# Patient Record
Sex: MALE | Race: BLACK OR AFRICAN AMERICAN | NOT HISPANIC OR LATINO | URBAN - METROPOLITAN AREA
[De-identification: names, ages, dates, MRNs, and addresses within clinical notes are randomized per-mention and may not be internally consistent; named-entity substitution may affect disease eponyms.]

---

## 2021-10-22 ENCOUNTER — INPATIENT (INPATIENT)
Facility: HOSPITAL | Age: 56
LOS: 5 days | Discharge: HOME | End: 2021-10-28
Attending: INTERNAL MEDICINE | Admitting: INTERNAL MEDICINE
Payer: MEDICAID

## 2021-10-22 VITALS
RESPIRATION RATE: 18 BRPM | HEART RATE: 126 BPM | SYSTOLIC BLOOD PRESSURE: 264 MMHG | OXYGEN SATURATION: 100 % | TEMPERATURE: 97 F | DIASTOLIC BLOOD PRESSURE: 135 MMHG

## 2021-10-22 LAB
ALBUMIN SERPL ELPH-MCNC: 4.4 G/DL — SIGNIFICANT CHANGE UP (ref 3.5–5.2)
ALP SERPL-CCNC: 66 U/L — SIGNIFICANT CHANGE UP (ref 30–115)
ALT FLD-CCNC: 16 U/L — SIGNIFICANT CHANGE UP (ref 0–41)
ANION GAP SERPL CALC-SCNC: 15 MMOL/L — HIGH (ref 7–14)
APTT BLD: 32.1 SEC — SIGNIFICANT CHANGE UP (ref 27–39.2)
AST SERPL-CCNC: 45 U/L — HIGH (ref 0–41)
BASOPHILS # BLD AUTO: 0.02 K/UL — SIGNIFICANT CHANGE UP (ref 0–0.2)
BASOPHILS NFR BLD AUTO: 0.3 % — SIGNIFICANT CHANGE UP (ref 0–1)
BILIRUB SERPL-MCNC: 1.4 MG/DL — HIGH (ref 0.2–1.2)
BUN SERPL-MCNC: 16 MG/DL — SIGNIFICANT CHANGE UP (ref 10–20)
CALCIUM SERPL-MCNC: 9 MG/DL — SIGNIFICANT CHANGE UP (ref 8.5–10.1)
CHLORIDE SERPL-SCNC: 100 MMOL/L — SIGNIFICANT CHANGE UP (ref 98–110)
CO2 SERPL-SCNC: 22 MMOL/L — SIGNIFICANT CHANGE UP (ref 17–32)
CREAT SERPL-MCNC: 1.3 MG/DL — SIGNIFICANT CHANGE UP (ref 0.7–1.5)
EOSINOPHIL # BLD AUTO: 0 K/UL — SIGNIFICANT CHANGE UP (ref 0–0.7)
EOSINOPHIL NFR BLD AUTO: 0 % — SIGNIFICANT CHANGE UP (ref 0–8)
GLUCOSE SERPL-MCNC: 109 MG/DL — HIGH (ref 70–99)
HCT VFR BLD CALC: 42.4 % — SIGNIFICANT CHANGE UP (ref 42–52)
HGB BLD-MCNC: 14.1 G/DL — SIGNIFICANT CHANGE UP (ref 14–18)
IMM GRANULOCYTES NFR BLD AUTO: 0.3 % — SIGNIFICANT CHANGE UP (ref 0.1–0.3)
INR BLD: 1.15 RATIO — SIGNIFICANT CHANGE UP (ref 0.65–1.3)
LYMPHOCYTES # BLD AUTO: 0.93 K/UL — LOW (ref 1.2–3.4)
LYMPHOCYTES # BLD AUTO: 16.1 % — LOW (ref 20.5–51.1)
MCHC RBC-ENTMCNC: 29.3 PG — SIGNIFICANT CHANGE UP (ref 27–31)
MCHC RBC-ENTMCNC: 33.3 G/DL — SIGNIFICANT CHANGE UP (ref 32–37)
MCV RBC AUTO: 88.1 FL — SIGNIFICANT CHANGE UP (ref 80–94)
MONOCYTES # BLD AUTO: 0.32 K/UL — SIGNIFICANT CHANGE UP (ref 0.1–0.6)
MONOCYTES NFR BLD AUTO: 5.5 % — SIGNIFICANT CHANGE UP (ref 1.7–9.3)
NEUTROPHILS # BLD AUTO: 4.5 K/UL — SIGNIFICANT CHANGE UP (ref 1.4–6.5)
NEUTROPHILS NFR BLD AUTO: 77.8 % — HIGH (ref 42.2–75.2)
NRBC # BLD: 0 /100 WBCS — SIGNIFICANT CHANGE UP (ref 0–0)
PLATELET # BLD AUTO: 161 K/UL — SIGNIFICANT CHANGE UP (ref 130–400)
POTASSIUM SERPL-MCNC: 3.5 MMOL/L — SIGNIFICANT CHANGE UP (ref 3.5–5)
POTASSIUM SERPL-SCNC: 3.5 MMOL/L — SIGNIFICANT CHANGE UP (ref 3.5–5)
PROT SERPL-MCNC: 6.8 G/DL — SIGNIFICANT CHANGE UP (ref 6–8)
PROTHROM AB SERPL-ACNC: 13.2 SEC — HIGH (ref 9.95–12.87)
RBC # BLD: 4.81 M/UL — SIGNIFICANT CHANGE UP (ref 4.7–6.1)
RBC # FLD: 13.7 % — SIGNIFICANT CHANGE UP (ref 11.5–14.5)
SARS-COV-2 RNA SPEC QL NAA+PROBE: SIGNIFICANT CHANGE UP
SODIUM SERPL-SCNC: 137 MMOL/L — SIGNIFICANT CHANGE UP (ref 135–146)
TROPONIN T SERPL-MCNC: <0.01 NG/ML — SIGNIFICANT CHANGE UP
WBC # BLD: 5.79 K/UL — SIGNIFICANT CHANGE UP (ref 4.8–10.8)
WBC # FLD AUTO: 5.79 K/UL — SIGNIFICANT CHANGE UP (ref 4.8–10.8)

## 2021-10-22 PROCEDURE — 0042T: CPT

## 2021-10-22 PROCEDURE — 70496 CT ANGIOGRAPHY HEAD: CPT | Mod: 26,MA

## 2021-10-22 PROCEDURE — 36620 INSERTION CATHETER ARTERY: CPT

## 2021-10-22 PROCEDURE — 93010 ELECTROCARDIOGRAM REPORT: CPT

## 2021-10-22 PROCEDURE — 99291 CRITICAL CARE FIRST HOUR: CPT | Mod: 25

## 2021-10-22 PROCEDURE — 70498 CT ANGIOGRAPHY NECK: CPT | Mod: 26,MA

## 2021-10-22 PROCEDURE — 99222 1ST HOSP IP/OBS MODERATE 55: CPT

## 2021-10-22 RX ORDER — CHLORHEXIDINE GLUCONATE 213 G/1000ML
1 SOLUTION TOPICAL
Refills: 0 | Status: DISCONTINUED | OUTPATIENT
Start: 2021-10-22 | End: 2021-10-28

## 2021-10-22 RX ORDER — CLEVIDIPINE BUTYRATE 50MG/100ML
1 VIAL (ML) INTRAVENOUS
Qty: 25 | Refills: 0 | Status: DISCONTINUED | OUTPATIENT
Start: 2021-10-22 | End: 2021-10-22

## 2021-10-22 RX ORDER — CLEVIDIPINE BUTYRATE 50MG/100ML
1 VIAL (ML) INTRAVENOUS
Qty: 25 | Refills: 0 | Status: DISCONTINUED | OUTPATIENT
Start: 2021-10-22 | End: 2021-10-24

## 2021-10-22 RX ORDER — ACETAMINOPHEN 500 MG
650 TABLET ORAL EVERY 6 HOURS
Refills: 0 | Status: DISCONTINUED | OUTPATIENT
Start: 2021-10-22 | End: 2021-10-28

## 2021-10-22 RX ORDER — SODIUM CHLORIDE 9 MG/ML
1000 INJECTION INTRAMUSCULAR; INTRAVENOUS; SUBCUTANEOUS
Refills: 0 | Status: DISCONTINUED | OUTPATIENT
Start: 2021-10-22 | End: 2021-10-23

## 2021-10-22 RX ADMIN — SODIUM CHLORIDE 50 MILLILITER(S): 9 INJECTION INTRAMUSCULAR; INTRAVENOUS; SUBCUTANEOUS at 19:10

## 2021-10-22 RX ADMIN — Medication 2 MG/HR: at 15:01

## 2021-10-22 NOTE — ED PROVIDER NOTE - PHYSICAL EXAMINATION
CONSTITUTIONAL: Well-developed; well-nourished; in no acute distress.   SKIN: warm, dry  HEAD: Normocephalic; atraumatic.  EYES: R eye dilated, L eye constricted, ptosis R eye  ENT: No nasal discharge; airway clear.  NECK: Supple; non tender.  CARD: S1, S2 normal; no murmurs, gallops, or rubs. Regular rate and rhythm.   RESP: No wheezes, rales or rhonchi.  ABD: soft ntnd  EXT: Normal ROM.  No clubbing, cyanosis or edema.   LYMPH: No acute cervical adenopathy.  NEURO: Alert, oriented. ptosis R eye,  PSYCH: Cooperative, appropriate.

## 2021-10-22 NOTE — ED ADULT NURSE NOTE - CHIEF COMPLAINT QUOTE
Pt c/o dizziness and double vision to R eye that started 10/21/21 at 2215. FS 94. Pt hypertensive 264/135 in triage. Denies any other symptoms. Code stroke activated in triage.

## 2021-10-22 NOTE — ED PROVIDER NOTE - CARE PLAN
1 Principal Discharge DX:	PRES (posterior reversible encephalopathy syndrome)  Secondary Diagnosis:	Hypertensive emergency

## 2021-10-22 NOTE — ED PROVIDER NOTE - CLINICAL SUMMARY MEDICAL DECISION MAKING FREE TEXT BOX
57 Y/O M HTN WITH DIPLOPIA AND HA. EXAM WITH B/L CN3 PALSY. PT WITH MARKEDLY ELEVATED BP IN THE ED. CLEVIPREX STARTED. CT SCANS REVIEWED. NO ACUTE INFARCT. POSSIBLE PRES. PT ADMITTED TO NEURO ICU.

## 2021-10-22 NOTE — ED PROVIDER NOTE - ATTENDING CONTRIBUTION TO CARE
I personally evaluated the patient. I reviewed the Resident’s or Physician Assistant’s note (as assigned above), and agree with the findings and plan except as documented in my note.   57 Y/O M HTN, NONSMOKER C/O DIPLOPIA, DIZZINESS AND HEADACHE SINCE 10:15 PM LAST NIGHT. STROKE CODE ACTIVATED IN TRIAGE. NO N/V. NO PARESTHESIAS, MOTOR WEAKNESS, ATAXIA, SPEECH CHANGES. HEADACHE IS DESCRIBED AS PRESSURE AND IS DIFFUSE. NO NECK PAIN. NO RECENT ILLNESS, FEVER, CHILLS, COUGH, URI SXS. PT REPORTS COMPLIANCE WITH HTN MEDICATIONS. VITALS NOTED. . ALERT OX3 NAD. NCAT PERRL EOMI. CN 2-12 INTACT EXCEPT FOR B/L CN3 PALSY AND R EYE PTOSIS. NORMAL SPEECH. NO FACIAL DROOP. 5/5 MOTOR STRENGTH B/L UPPER AND B/L LOWER EXT. NO PRONATOR DRIFT. NORMAL FINGER TO NOSE B/L. NO SENSORY DEFICIT. RRR. LUNGS CLEAR B/L. ABD- SOFT NONTENDER. BACK NONTENDER. 2+ RADIAL AND PEDAL PULSES B/L.

## 2021-10-22 NOTE — CONSULT NOTE ADULT - SUBJECTIVE AND OBJECTIVE BOX
Stroke Consult Note:    TERESE BECKER     Chief Complaint: double vision    HPI: 56Y male with PMHx of HTN presents to the ED for diplopia. Patient states he has had double vision since 10:15pm yesterday. Patient also states associated sx of pressure headache. Patient has no hx of strokes.      Relevant Brain Tissue Imaging:  < from: CT Brain Stroke Protocol (10.22.21 @ 14:43) >  IMPRESSION:    No evidence for intracranial hemorrhage, large territorial infarct, midline shift, or mass effect.    These findings were discussed with AMERICA Diana at 10/22/2021 3:01 PM by Dr. Zuñiga with read back confirmation.      ATTENDING ADDENDUM:    Multiple scattered patchy low attenuations in the bilateral periventricular cerebral white matter without mass effect, nonspecific, could reflect chronic microvascular ischemic changes, demyelinating disease, or leukoencephalopathy. Recommend further evaluation with contrast-enhanced MRI.    --- End of Report ---      Relevant Cerebrovascular Imaging:   CT Angio Neck w/ IV Cont:   EXAM:  CT ANGIO BRAIN (W)AW IC        EXAM:  CT ANGIO NECK (W)AW IC        EXAM:  CT PERFUSION W MAPS IC        PROCEDURE DATE:  10/22/2021      INTERPRETATION:  CLINICAL INDICATION: Stroke code. Right-sided double vision, dizziness.    TECHNIQUE: CT angiography of the intracranial (head) and extracranial (neck) circulation was performed after contrast administration    Maximal intensity projection images were additionally included and reviewed.    RAPID PERFUSION images were obtained. Color maps were reviewed.    50 mls of Omnipaque 350 was administered intravenously and 50 mls were discarded. An additional 70 MLS of Omnipaque 350 was administered intravenously for CTA head/neck imaging.    Using a separate workstation, 3-D shaded surface reformations were made of vasculature. 3-D reformations were reviewed and included in interpretation of the official report.    CAROTID STENOSIS REFERENCE: (NASCET = 100x1-(N/D)). N=greatest narrowing. D=normal distal diameter - MILD = <50% stenosis. - MODERATE = 50-69% stenosis. - SEVERE = 70-89% stenosis. - HAIRLINE/CRITICAL = 90-99% stenosis. - OCCLUDED = 100% stenosis.    COMPARISON: None.    FINDINGS:    CT PERFUSION:    Using standard threshold of Tmax greater than 6 seconds, thereis an area of hypoperfusion in the right temporal lobe with a total volume of 4 mL. There is corresponding decreased CBV, CBF, and increased Tmax in this region.    CORE INFARCT: Using the threshold of CBF less than 30%, there is no area of ischemic core.      HEAD CTA:    The internal carotid arteries demonstrate normal enhancement to the intracranial circulation and [place]. Mild calcific atherosclerotic disease in the bilateral carotid siphons with mild focal stenosis in the distal cavernous segment of the right ICA.    Anterior and middle cerebral arteries demonstrate normal enhancement without intraluminal filling defect, stenosis, occlusion, aneurysm or vascular malformation.    The intradural vertebral arteries demonstrate normal enhancement to the vertebrobasilar confluence. Branch vasculature of the posterior circulation are within normal limits. The posterior cerebral arteries demonstrate symmetric enhancement and arborization without evidence for aneurysm, stenosis, occlusion or vascular malformation.    Visualized dural venous sinuses and deep cerebral venous structures demonstrate normal enhancement without evidence for filling defect.    NECK CTA:    The aortic arch and origins of the great vessels are within normal limits. Evaluation of the right subclavian artery is limited due to streak artifact from contrast within the right subclavian venous plexus.    Right:  The origin of the right common carotid artery is normal. The right common carotid artery is normal in course and caliber to the carotid bulb. Mild noncalcific/calcified atherosclerotic plaque at the right carotid bulb resulting in mild stenosis in the origin of the right internal carotid artery (less than 50%). The remaining right internal carotid artery has normal course and caliber to the intracranial circulation.    Left: The origin of the left common carotid artery is normal. The left common carotid artery is normal in course and caliber to the carotid bifurcation. Mild calcific atherosclerotic plaque at the left carotid bulb without flow-limiting stenosis. The left internal carotid artery has normal course and caliber to the intracranial circulation.    The vertebral arteries are codominant. The origin of the right vertebral artery is normal. The right vertebral artery is normal in course and caliber to the intracranial circulation.  The left vertebral artery originates from the aortic arch. There is mild irregularity in the V3 segment of the left vertebral artery, nonspecific.    Other:  Visualized airways are unremarkable.  There is mild degenerative change of the cervical spine.  Please see separately dictated report for the concurrently performed CT of the head for intracranial findings.    IMPRESSION:    CT PERFUSION:  Calculated hypoperfusion in the right temporal lobe (4 mL) without perfusion evidence of core infarct. Decreased CBV, CBF, and increased Tmax in this region is felt to be artifactual. No evidence of corresponding CTA abnormality.      CTA HEAD/NECK:  No evidence of large vessel occlusion, aneurysm, vascular malformation.    Mild atherosclerotic stenosis in the origin (less than 50%) and in the distal cavernous segment of the right ICA.    --- End of Report ---    Relevant blood tests:                          14.1   5.79  )-----------( 161      ( 22 Oct 2021 14:36 )             42.4   PT/INR - ( 22 Oct 2021 14:36 )   PT: 13.20 sec;   INR: 1.15 ratio         PTT - ( 22 Oct 2021 14:36 )  PTT:32.1 sec  10-22    137  |  100  |  16  ----------------------------<  109<H>  3.5   |  22  |  1.3    Ca    9.0      22 Oct 2021 14:36    TPro  6.8  /  Alb  4.4  /  TBili  1.4<H>  /  DBili  x   /  AST  45<H>  /  ALT  16  /  AlkPhos  66  10-22      Home Medications:      MEDICATIONS  (STANDING):  clevidipine Infusion 1 mG/Hr (2 mL/Hr) IV Continuous <Continuous>      11. Exam:    Vital Signs Last 24 Hrs  T(C): 36.1 (22 Oct 2021 14:29), Max: 36.1 (22 Oct 2021 14:29)  T(F): 97 (22 Oct 2021 14:29), Max: 97 (22 Oct 2021 14:29)  HR: 98 (22 Oct 2021 17:54) (98 - 126)  BP: 188/106 (22 Oct 2021 17:54) (186/96 - 264/135)  BP(mean): --  RR: 18 (22 Oct 2021 17:54) (18 - 18)  SpO2: 100% (22 Oct 2021 17:54) (100% - 100%)    12.     NIH STROKE SCALE  Item	                                                        Score  1 a.	Level of Consciousness	               	0  1 b. LOC Questions	                                0  1 c.	LOC Commands	                               	0  2.	Best Gaze	                                        1  3.	Visual	                                                0  4.	Facial Palsy	                                        0  5 a.	Motor Arm - Left	                                0  5 b.	Motor Arm - Right	                        0  6 a.	Motor Leg - Left	                                0  6 b.	Motor Leg - Right	                                0  7.	Limb Ataxia	                                        0  8.	Sensory	                                                0  9.	Language	                                        0  10.	Dysarthria	                                        0  11.	Extinction and Inattention  	        0  ______________________________________  TOTAL	                                                        0    Total NIHSS on admission:      NIHSS yesterday:      NIHSS today: 1  +ptosis on the right eye  +dilated pupil 7-8mm on right eye, 2-3mm constriction on the left eye  +b/l cranial nerve 3 palsies   mRS:  0 No symptoms at all  1 No significant disability despite symptoms; able to carry out all usual duties and activities without assistance  2 Slight disability; unable to carry out all previous activities, but able to look after own affairs  3 Moderate disability; requiring some help, but able to walk without assistance  4 Moderately severe disability; unable to walk without assistance and unable to attend to own bodily needs without assistance  5 Severe disability; bedridden, incontinent and requiring constant nursing care and attention  6 Dead      Assessment: 56Y male with PMHx of HTN presents to the ED for diplopia. Patient states he has had double vision since 10:15pm yesterday. Patient also states associated sx of pressure headache. Patient has no hx of strokes. CTH- negative for acute findings, also multiple scattered patchy low attenuations in the bilateral periventricular cerebral white matter without mass effect, nonspecific, could reflect chronic microvascular ischemic changes, demyelinating disease, or leukoencephalopathy. CTA- no LVO, Mild atherosclerotic stenosis in the origin (less than 50%) and in the distal cavernous segment of the right ICA. BP on arrival systolic was in the 260s.     DDx: Hypertensive emergency, PRESS, b/l cranial neuropathies     Suggestions:  Admit patient to [facility] for q1 neurochecks and vital signs  Keep -220  Telemonitoring   Follow stroke orderset  MRI with brain non contrast  Case discussed with Dr. Newman. Pending note to follow    Stroke Consult Note:    TERESE BECKER     Chief Complaint: double vision    HPI: 56Y male with PMHx of HTN presents to the ED for diplopia. Patient states he has had double vision since 10:15pm yesterday. Patient also states associated sx of pressure headache. Patient has no hx of strokes.      Relevant Brain Tissue Imaging:  < from: CT Brain Stroke Protocol (10.22.21 @ 14:43) >  IMPRESSION:    No evidence for intracranial hemorrhage, large territorial infarct, midline shift, or mass effect.    These findings were discussed with AMERICA Diana at 10/22/2021 3:01 PM by Dr. Zuñiga with read back confirmation.      ATTENDING ADDENDUM:    Multiple scattered patchy low attenuations in the bilateral periventricular cerebral white matter without mass effect, nonspecific, could reflect chronic microvascular ischemic changes, demyelinating disease, or leukoencephalopathy. Recommend further evaluation with contrast-enhanced MRI.    --- End of Report ---      Relevant Cerebrovascular Imaging:   CT Angio Neck w/ IV Cont:   EXAM:  CT ANGIO BRAIN (W)AW IC        EXAM:  CT ANGIO NECK (W)AW IC        EXAM:  CT PERFUSION W MAPS IC        PROCEDURE DATE:  10/22/2021      INTERPRETATION:  CLINICAL INDICATION: Stroke code. Right-sided double vision, dizziness.    TECHNIQUE: CT angiography of the intracranial (head) and extracranial (neck) circulation was performed after contrast administration    Maximal intensity projection images were additionally included and reviewed.    RAPID PERFUSION images were obtained. Color maps were reviewed.    50 mls of Omnipaque 350 was administered intravenously and 50 mls were discarded. An additional 70 MLS of Omnipaque 350 was administered intravenously for CTA head/neck imaging.    Using a separate workstation, 3-D shaded surface reformations were made of vasculature. 3-D reformations were reviewed and included in interpretation of the official report.    CAROTID STENOSIS REFERENCE: (NASCET = 100x1-(N/D)). N=greatest narrowing. D=normal distal diameter - MILD = <50% stenosis. - MODERATE = 50-69% stenosis. - SEVERE = 70-89% stenosis. - HAIRLINE/CRITICAL = 90-99% stenosis. - OCCLUDED = 100% stenosis.    COMPARISON: None.    FINDINGS:    CT PERFUSION:    Using standard threshold of Tmax greater than 6 seconds, thereis an area of hypoperfusion in the right temporal lobe with a total volume of 4 mL. There is corresponding decreased CBV, CBF, and increased Tmax in this region.    CORE INFARCT: Using the threshold of CBF less than 30%, there is no area of ischemic core.      HEAD CTA:    The internal carotid arteries demonstrate normal enhancement to the intracranial circulation and [place]. Mild calcific atherosclerotic disease in the bilateral carotid siphons with mild focal stenosis in the distal cavernous segment of the right ICA.    Anterior and middle cerebral arteries demonstrate normal enhancement without intraluminal filling defect, stenosis, occlusion, aneurysm or vascular malformation.    The intradural vertebral arteries demonstrate normal enhancement to the vertebrobasilar confluence. Branch vasculature of the posterior circulation are within normal limits. The posterior cerebral arteries demonstrate symmetric enhancement and arborization without evidence for aneurysm, stenosis, occlusion or vascular malformation.    Visualized dural venous sinuses and deep cerebral venous structures demonstrate normal enhancement without evidence for filling defect.    NECK CTA:    The aortic arch and origins of the great vessels are within normal limits. Evaluation of the right subclavian artery is limited due to streak artifact from contrast within the right subclavian venous plexus.    Right:  The origin of the right common carotid artery is normal. The right common carotid artery is normal in course and caliber to the carotid bulb. Mild noncalcific/calcified atherosclerotic plaque at the right carotid bulb resulting in mild stenosis in the origin of the right internal carotid artery (less than 50%). The remaining right internal carotid artery has normal course and caliber to the intracranial circulation.    Left: The origin of the left common carotid artery is normal. The left common carotid artery is normal in course and caliber to the carotid bifurcation. Mild calcific atherosclerotic plaque at the left carotid bulb without flow-limiting stenosis. The left internal carotid artery has normal course and caliber to the intracranial circulation.    The vertebral arteries are codominant. The origin of the right vertebral artery is normal. The right vertebral artery is normal in course and caliber to the intracranial circulation.  The left vertebral artery originates from the aortic arch. There is mild irregularity in the V3 segment of the left vertebral artery, nonspecific.    Other:  Visualized airways are unremarkable.  There is mild degenerative change of the cervical spine.  Please see separately dictated report for the concurrently performed CT of the head for intracranial findings.    IMPRESSION:    CT PERFUSION:  Calculated hypoperfusion in the right temporal lobe (4 mL) without perfusion evidence of core infarct. Decreased CBV, CBF, and increased Tmax in this region is felt to be artifactual. No evidence of corresponding CTA abnormality.      CTA HEAD/NECK:  No evidence of large vessel occlusion, aneurysm, vascular malformation.    Mild atherosclerotic stenosis in the origin (less than 50%) and in the distal cavernous segment of the right ICA.    --- End of Report ---    Relevant blood tests:                          14.1   5.79  )-----------( 161      ( 22 Oct 2021 14:36 )             42.4   PT/INR - ( 22 Oct 2021 14:36 )   PT: 13.20 sec;   INR: 1.15 ratio         PTT - ( 22 Oct 2021 14:36 )  PTT:32.1 sec  10-22    137  |  100  |  16  ----------------------------<  109<H>  3.5   |  22  |  1.3    Ca    9.0      22 Oct 2021 14:36    TPro  6.8  /  Alb  4.4  /  TBili  1.4<H>  /  DBili  x   /  AST  45<H>  /  ALT  16  /  AlkPhos  66  10-22      Home Medications:      MEDICATIONS  (STANDING):  clevidipine Infusion 1 mG/Hr (2 mL/Hr) IV Continuous <Continuous>      11. Exam:    Vital Signs Last 24 Hrs  T(C): 36.1 (22 Oct 2021 14:29), Max: 36.1 (22 Oct 2021 14:29)  T(F): 97 (22 Oct 2021 14:29), Max: 97 (22 Oct 2021 14:29)  HR: 98 (22 Oct 2021 17:54) (98 - 126)  BP: 188/106 (22 Oct 2021 17:54) (186/96 - 264/135)  BP(mean): --  RR: 18 (22 Oct 2021 17:54) (18 - 18)  SpO2: 100% (22 Oct 2021 17:54) (100% - 100%)    12.     NIH STROKE SCALE  Item	                                                        Score  1 a.	Level of Consciousness	               	0  1 b. LOC Questions	                                0  1 c.	LOC Commands	                               	0  2.	Best Gaze	                                        0  3.	Visual	                                                0  4.	Facial Palsy	                                        0  5 a.	Motor Arm - Left	                                0  5 b.	Motor Arm - Right	                        0  6 a.	Motor Leg - Left	                                0  6 b.	Motor Leg - Right	                                0  7.	Limb Ataxia	                                        0  8.	Sensory	                                                0  9.	Language	                                        0  10.	Dysarthria	                                        0  11.	Extinction and Inattention  	        0  ______________________________________  TOTAL	                                                        0    Total NIHSS on admission:      NIHSS yesterday:      NIHSS today: 0  +ptosis on the right eye  +dilated pupil 7-8mm on right eye, 2-3mm constriction on the left eye  +b/l cranial nerve 3 palsies   mRS:  0 No symptoms at all  1 No significant disability despite symptoms; able to carry out all usual duties and activities without assistance  2 Slight disability; unable to carry out all previous activities, but able to look after own affairs  3 Moderate disability; requiring some help, but able to walk without assistance  4 Moderately severe disability; unable to walk without assistance and unable to attend to own bodily needs without assistance  5 Severe disability; bedridden, incontinent and requiring constant nursing care and attention  6 Dead      Assessment: 56Y male with PMHx of HTN presents to the ED for diplopia. Patient states he has had double vision since 10:15pm yesterday. Patient also states associated sx of pressure headache. Patient has no hx of strokes. CTH- negative for acute findings, also multiple scattered patchy low attenuations in the bilateral periventricular cerebral white matter without mass effect, nonspecific, could reflect chronic microvascular ischemic changes, demyelinating disease, or leukoencephalopathy. CTA- no LVO, Mild atherosclerotic stenosis in the origin (less than 50%) and in the distal cavernous segment of the right ICA. BP on arrival systolic was in the 260s.     DDx: Hypertensive emergency, PRESS, b/l cranial neuropathies     Suggestions:  Admit patient to [facility] for q1 neurochecks and vital signs  Keep -220  Telemonitoring   Follow stroke orderset  MRI with brain non contrast  Case discussed with Dr. Newman. Pending note to follow

## 2021-10-22 NOTE — ED PROVIDER NOTE - OBJECTIVE STATEMENT
55 yo male hx of htn presenting with diplopia and dizziness starting last night around 10 pm associated with mild headache. denies chest pain, sob, weakness, numbness, tingling, fever.

## 2021-10-22 NOTE — ED PROVIDER NOTE - NS ED ROS FT
Constitutional:  see HPI  Head:  dizziness; no headache, loss of consciousness  Eyes:  blurry vision  ENMT:  no ear pain or discharge; no hearing problems; no mouth or throat sores or lesions; no throat pain  Cardiac: no chest pain, tachycardia or palpitations  Respiratory: no cough, wheezing, shortness of breath, chest tightness, or trouble breathing  GI: no nausea, vomiting, diarrhea or abdominal pain  :  no dysuria, frequency, or burning with urination; no change in urine output  MS: no myalgias, muscle weakness, joint pain,or  injury; no joint swelling  Neuro: no weakness; no numbness or tingling; no seizure  Skin:  no rashes or color changes; no lacerations or abrasions

## 2021-10-22 NOTE — ED ADULT TRIAGE NOTE - CHIEF COMPLAINT QUOTE
Pt c/o dizziness and double vision to R eye that started 10/21/21 at 2215. FS 94. Denies any other symptoms. Code stroke activated in triage. Pt c/o dizziness and double vision to R eye that started 10/21/21 at 2215. FS 94. Pt hypertensive 264/135 in triage. Denies any other symptoms. Code stroke activated in triage.

## 2021-10-23 LAB
A1C WITH ESTIMATED AVERAGE GLUCOSE RESULT: 5.9 % — HIGH (ref 4–5.6)
ALBUMIN SERPL ELPH-MCNC: 4.6 G/DL — SIGNIFICANT CHANGE UP (ref 3.5–5.2)
ALP SERPL-CCNC: 69 U/L — SIGNIFICANT CHANGE UP (ref 30–115)
ALT FLD-CCNC: 15 U/L — SIGNIFICANT CHANGE UP (ref 0–41)
ANION GAP SERPL CALC-SCNC: 14 MMOL/L — SIGNIFICANT CHANGE UP (ref 7–14)
APPEARANCE UR: CLEAR — SIGNIFICANT CHANGE UP
AST SERPL-CCNC: 40 U/L — SIGNIFICANT CHANGE UP (ref 0–41)
BASOPHILS # BLD AUTO: 0.02 K/UL — SIGNIFICANT CHANGE UP (ref 0–0.2)
BASOPHILS NFR BLD AUTO: 0.5 % — SIGNIFICANT CHANGE UP (ref 0–1)
BILIRUB SERPL-MCNC: 1.6 MG/DL — HIGH (ref 0.2–1.2)
BILIRUB UR-MCNC: NEGATIVE — SIGNIFICANT CHANGE UP
BUN SERPL-MCNC: 18 MG/DL — SIGNIFICANT CHANGE UP (ref 10–20)
CALCIUM SERPL-MCNC: 8.9 MG/DL — SIGNIFICANT CHANGE UP (ref 8.5–10.1)
CHLORIDE SERPL-SCNC: 107 MMOL/L — SIGNIFICANT CHANGE UP (ref 98–110)
CHOLEST SERPL-MCNC: 267 MG/DL — HIGH
CK MB CFR SERPL CALC: 3.6 NG/ML — SIGNIFICANT CHANGE UP (ref 0.6–6.3)
CO2 SERPL-SCNC: 24 MMOL/L — SIGNIFICANT CHANGE UP (ref 17–32)
COLOR SPEC: SIGNIFICANT CHANGE UP
COVID-19 SPIKE DOMAIN AB INTERP: POSITIVE
COVID-19 SPIKE DOMAIN ANTIBODY RESULT: 71.9 U/ML — HIGH
CREAT SERPL-MCNC: 1.1 MG/DL — SIGNIFICANT CHANGE UP (ref 0.7–1.5)
DIFF PNL FLD: NEGATIVE — SIGNIFICANT CHANGE UP
EOSINOPHIL # BLD AUTO: 0.02 K/UL — SIGNIFICANT CHANGE UP (ref 0–0.7)
EOSINOPHIL NFR BLD AUTO: 0.5 % — SIGNIFICANT CHANGE UP (ref 0–8)
ESTIMATED AVERAGE GLUCOSE: 123 MG/DL — HIGH (ref 68–114)
GLUCOSE SERPL-MCNC: 103 MG/DL — HIGH (ref 70–99)
GLUCOSE UR QL: SIGNIFICANT CHANGE UP
HCT VFR BLD CALC: 47.8 % — SIGNIFICANT CHANGE UP (ref 42–52)
HCV AB S/CO SERPL IA: 0.03 COI — SIGNIFICANT CHANGE UP
HCV AB SERPL-IMP: SIGNIFICANT CHANGE UP
HDLC SERPL-MCNC: 87 MG/DL — SIGNIFICANT CHANGE UP
HGB BLD-MCNC: 15.4 G/DL — SIGNIFICANT CHANGE UP (ref 14–18)
IMM GRANULOCYTES NFR BLD AUTO: 0.2 % — SIGNIFICANT CHANGE UP (ref 0.1–0.3)
KETONES UR-MCNC: ABNORMAL
LEUKOCYTE ESTERASE UR-ACNC: NEGATIVE — SIGNIFICANT CHANGE UP
LIPID PNL WITH DIRECT LDL SERPL: 166 MG/DL — HIGH
LYMPHOCYTES # BLD AUTO: 0.91 K/UL — LOW (ref 1.2–3.4)
LYMPHOCYTES # BLD AUTO: 21.6 % — SIGNIFICANT CHANGE UP (ref 20.5–51.1)
MAGNESIUM SERPL-MCNC: 2.4 MG/DL — SIGNIFICANT CHANGE UP (ref 1.8–2.4)
MCHC RBC-ENTMCNC: 28.6 PG — SIGNIFICANT CHANGE UP (ref 27–31)
MCHC RBC-ENTMCNC: 32.2 G/DL — SIGNIFICANT CHANGE UP (ref 32–37)
MCV RBC AUTO: 88.8 FL — SIGNIFICANT CHANGE UP (ref 80–94)
MONOCYTES # BLD AUTO: 0.35 K/UL — SIGNIFICANT CHANGE UP (ref 0.1–0.6)
MONOCYTES NFR BLD AUTO: 8.3 % — SIGNIFICANT CHANGE UP (ref 1.7–9.3)
NEUTROPHILS # BLD AUTO: 2.9 K/UL — SIGNIFICANT CHANGE UP (ref 1.4–6.5)
NEUTROPHILS NFR BLD AUTO: 68.9 % — SIGNIFICANT CHANGE UP (ref 42.2–75.2)
NITRITE UR-MCNC: NEGATIVE — SIGNIFICANT CHANGE UP
NON HDL CHOLESTEROL: 180 MG/DL — HIGH
NRBC # BLD: 0 /100 WBCS — SIGNIFICANT CHANGE UP (ref 0–0)
PH UR: 7 — SIGNIFICANT CHANGE UP (ref 5–8)
PHOSPHATE SERPL-MCNC: 2.7 MG/DL — SIGNIFICANT CHANGE UP (ref 2.1–4.9)
PLATELET # BLD AUTO: 173 K/UL — SIGNIFICANT CHANGE UP (ref 130–400)
POTASSIUM SERPL-MCNC: 3.7 MMOL/L — SIGNIFICANT CHANGE UP (ref 3.5–5)
POTASSIUM SERPL-SCNC: 3.7 MMOL/L — SIGNIFICANT CHANGE UP (ref 3.5–5)
PROT SERPL-MCNC: 6.9 G/DL — SIGNIFICANT CHANGE UP (ref 6–8)
PROT UR-MCNC: SIGNIFICANT CHANGE UP
RBC # BLD: 5.38 M/UL — SIGNIFICANT CHANGE UP (ref 4.7–6.1)
RBC # FLD: 14.2 % — SIGNIFICANT CHANGE UP (ref 11.5–14.5)
SARS-COV-2 IGG+IGM SERPL QL IA: 71.9 U/ML — HIGH
SARS-COV-2 IGG+IGM SERPL QL IA: POSITIVE
SODIUM SERPL-SCNC: 145 MMOL/L — SIGNIFICANT CHANGE UP (ref 135–146)
SP GR SPEC: 1.02 — SIGNIFICANT CHANGE UP (ref 1.01–1.03)
TRIGL SERPL-MCNC: 58 MG/DL — SIGNIFICANT CHANGE UP
TROPONIN T SERPL-MCNC: <0.01 NG/ML — SIGNIFICANT CHANGE UP
UROBILINOGEN FLD QL: SIGNIFICANT CHANGE UP
WBC # BLD: 4.21 K/UL — LOW (ref 4.8–10.8)
WBC # FLD AUTO: 4.21 K/UL — LOW (ref 4.8–10.8)

## 2021-10-23 PROCEDURE — 70545 MR ANGIOGRAPHY HEAD W/DYE: CPT | Mod: 26,59

## 2021-10-23 PROCEDURE — 70553 MRI BRAIN STEM W/O & W/DYE: CPT | Mod: 26

## 2021-10-23 PROCEDURE — 99291 CRITICAL CARE FIRST HOUR: CPT

## 2021-10-23 RX ORDER — ASPIRIN/CALCIUM CARB/MAGNESIUM 324 MG
1 TABLET ORAL
Qty: 0 | Refills: 0 | DISCHARGE

## 2021-10-23 RX ORDER — ENOXAPARIN SODIUM 100 MG/ML
40 INJECTION SUBCUTANEOUS DAILY
Refills: 0 | Status: DISCONTINUED | OUTPATIENT
Start: 2021-10-23 | End: 2021-10-28

## 2021-10-23 RX ORDER — SODIUM CHLORIDE 9 MG/ML
1000 INJECTION, SOLUTION INTRAVENOUS
Refills: 0 | Status: DISCONTINUED | OUTPATIENT
Start: 2021-10-23 | End: 2021-10-25

## 2021-10-23 RX ADMIN — SODIUM CHLORIDE 50 MILLILITER(S): 9 INJECTION, SOLUTION INTRAVENOUS at 14:00

## 2021-10-23 RX ADMIN — CHLORHEXIDINE GLUCONATE 1 APPLICATION(S): 213 SOLUTION TOPICAL at 06:50

## 2021-10-23 RX ADMIN — Medication 2 MG/HR: at 07:37

## 2021-10-23 RX ADMIN — Medication 2 MG/HR: at 16:11

## 2021-10-23 RX ADMIN — Medication 2 MG/HR: at 04:59

## 2021-10-23 NOTE — ED ADULT NURSE REASSESSMENT NOTE - NS ED NURSE REASSESS COMMENT FT1
pt is aaox3, nad, respirations easy and regular, skin is warm, dry, and normal in color, pt is resting and comfortable, cleviprex infusing at 15ml/ hr

## 2021-10-23 NOTE — H&P ADULT - NSHPLABSRESULTS_GEN_ALL_CORE
ICU Vital Signs Last 24 Hrs  T(C): 36.1 (22 Oct 2021 14:29), Max: 36.1 (22 Oct 2021 14:29)  T(F): 97 (22 Oct 2021 14:29), Max: 97 (22 Oct 2021 14:29)  HR: 64 (23 Oct 2021 01:07) (64 - 126)  BP: 179/105 (23 Oct 2021 01:07) (138/75 - 264/135)  BP(mean): --  ABP: --  ABP(mean): --  RR: 18 (23 Oct 2021 01:07) (18 - 18)  SpO2: 100% (23 Oct 2021 01:07) (100% - 100%)    acetaminophen     Tablet .. 650 milliGRAM(s) Oral every 6 hours PRN  chlorhexidine 4% Liquid 1 Application(s) Topical <User Schedule>  clevidipine Infusion 1 mG/Hr (2 mL/Hr) IV Continuous <Continuous>  enoxaparin Injectable 40 milliGRAM(s) SubCutaneous daily  sodium chloride 0.9%. 1000 milliLiter(s) (50 mL/Hr) IV Continuous <Continuous>    LABS:  Na: 137 (10-22 @ 14:36)  K: 3.5 (10-22 @ 14:36)  Cl: 100 (10-22 @ 14:36)  CO2: 22 (10-22 @ 14:36)  BUN: 16 (10-22 @ 14:36)  Cr: 1.3 (10-22 @ 14:36)  Glu: 109(10-22 @ 14:36)    Hgb: 14.1 (10-22 @ 14:36)  Hct: 42.4 (10-22 @ 14:36)  WBC: 5.79 (10-22 @ 14:36)  Plt: 161 (10-22 @ 14:36)    INR: 1.15 10-22-21 @ 14:36  PTT: 32.1 10-22-21 @ 14:36    LIVER FUNCTIONS - ( 22 Oct 2021 14:36 )  Alb: 4.4 g/dL / Pro: 6.8 g/dL / ALK PHOS: 66 U/L / ALT: 16 U/L / AST: 45 U/L / GGT: x           Imaging:  EXAM:  CT ANGIO BRAIN (W)AW IC/CT ANGIO NECK (W)AW IC/CT PERFUSION W MAPS IC (10/22/2021):   IMPRESSION:  CT PERFUSION:  Calculated hypoperfusion in the right temporal lobe (4 mL) without perfusion evidence of core infarct. Decreased CBV, CBF, and increased Tmax in this region is felt to be artifactual. No evidence of corresponding CTA abnormality.  CTA HEAD/NECK:  No evidence of large vessel occlusion, aneurysm, vascular malformation.  Mild atherosclerotic stenosis in the origin (less than 50%) and in the distal cavernous segment of the right ICA.    EXAM:  CT BRAIN STROKE PROTOCOL (10/22/2021):      IMPRESSION:  No evidence for intracranial hemorrhage, large territorial infarct, midline shift, or mass effect.  ATTENDING ADDENDUM:  Multiple scattered patchy low attenuations in the bilateral periventricular cerebral white matter without mass effect, nonspecific, could reflect chronic microvascular ischemic changes, demyelinating disease, or leukoencephalopathy. Recommend further evaluation with contrast-enhanced MRI.

## 2021-10-23 NOTE — H&P ADULT - NSHPPHYSICALEXAM_GEN_ALL_CORE
EXAMINATION:  General: No acute distress  HEENT: Anicteric sclerae  Cardiac: [id number]  Lungs: Clear  Abdomen: Soft, non-tender, +BS  Extremities: No c/c/e  Skin/Incision Site: Clean, dry and intact  Neurologic: Awake, alert, fully oriented, follows commands, ptosis on Right eye, B/L cranial nerve 3 palsy that can be overcome and crosses midline, EOMI, VFFtc, EOMI, face symmetric, tongue midline, no drift, full strength 5/5, sensation intact, no neglect    NIHSS = 0

## 2021-10-23 NOTE — H&P ADULT - HISTORY OF PRESENT ILLNESS
56-year-old male with PMHx HTN p/w diplopia since 2215 the night prior to admission, a/w pressure headache. Stroke code activated. CTH revealed no acute pathology, with multiple scattered patchy low attenuations in the bilateral periventricular cerebral white matter without mass effect, nonspecific, could reflect chronic microvascular ischemic changes, demyelinating disease, or leukoencephalopathy. CTA head/neck, no LVO, with mild atherosclerotic stenosis in the origin (less than 50%) and in the distal cavernous segment of the right ICA. CTP revealed calculated hypoperfusion in the right temporal lobe (4 mL) without perfusion evidence of core infarct. Decreased CBV, CBF, and increased Tmax in this region is felt to be artifactual. No evidence of corresponding CTA abnormality. In ED, HTN emergency, 264/135, started on Cleviprex for BP control. Admit to NCCU for optimization.

## 2021-10-23 NOTE — H&P ADULT - NSHPREVIEWOFSYSTEMS_GEN_ALL_CORE
Patient denies headache, nausea/vomiting, blurred vision, double vision, or dizziness. Otherwise, 10-point ROS is negative.

## 2021-10-23 NOTE — H&P ADULT - ASSESSMENT
ASSESSMENT: 56-year-old male with HTN p/w diplopia a/w pressure headache. In ED, HTN emergency, 264/135. CTH no acute pathology. no LVO. DDx Hypertensive emergency and PRESS. Admit to NCCU for optimization.     PLAN:   NEURO:  - Neuro checks q1hrs  - MRI/MRV urgent  - Stroke core measures: lipid profile, HgA1c, TSH  Activity: [X] OOB as tolerated [] Bedrest [X] PT [X] OT [] PMNR    PULM:  - HOB > 35 degrees  - Aspiration precautions  - Keep SaO2 > 95%    CV:  - Keep -200, utilize Cleviprex as needed  - Telemetry monitoring  - 12-lead ECG  - Trend cardiac enzymes  - TTE/2D echo  - 12-lead ECG: LVH w/ Right BBB    RENAL:  - Strict I/Os  - Daily weights  - Keep euvolemic  - Keep normonatremic   - Keep Magnesium level > 2  - Monitor lytes, replete as needed  - Normal saline @50cc/hr    GI:  Diet: Dysphagia screen and then advance diet as tolerated  GI prophylaxis [X] not indicated [] PPI [] other:  Bowel regimen [] colace [X] senna [] other:    ENDO:   - Goal euglycemia (-180)  - Send HgA1c    HEME/ONC:  VTE prophylaxis: [X] SCDs [] chemoprophylaxis [] hold chemoprophylaxis due to: [] high risk of DVT/PE on admission due to:  - Start Lovenox 40mg q24hrs    ID:  - Keep normothermic, avoid fevers    MISC:  PT/OT    CODE STATUS:  [X] Full Code [] DNR [] DNI [] Palliative/Comfort Care    DISPOSITION:  [X] ICU [] Stroke Unit [] Floor [] EMU [] RCU [] PCU   ASSESSMENT: 56-year-old male with HTN p/w diplopia a/w pressure headache. In ED, HTN emergency, 264/135. CTH no acute pathology. no LVO. DDx Hypertensive emergency and r/o PRES. Admit to NCCU for optimization.     PLAN:   NEURO:  - Neuro checks q1hrs  - MRI/MRV  - Stroke core measures:   tox screen  Activity: [X] OOB as tolerated [] Bedrest [X] PT [X] OT [] PMNR    PULM:  RA     CV:  - Keep -200, utilize Cleviprex as needed  - Telemetry monitoring  - 12-lead ECG  - cardiac enzymes 2 negative  - TTE/2D echo  - 12-lead ECG: LVH w/ Right BBB    RENAL:  normal Na goal  replete electrolytes    GI:  Diet: DASH diet  GI prophylaxis [X] not indicated [] PPI [] other:  Bowel regimen [] colace [X] senna [] other:    ENDO:   - Goal euglycemia (-180)  - Send HgA1c 5.9    HEME/ONC:  VTE prophylaxis: [X] SCDs [x] chemoprophylaxis [] hold chemoprophylaxis due to: [] high risk of DVT/PE on admission due to:  -Lovenox 40mg q24hrs    ID:  - Keep normothermic, avoid fevers    MISC:  PT/OT    CODE STATUS:  [X] Full Code [] DNR [] DNI [] Palliative/Comfort Care    DISPOSITION:  [X] ICU [] Stroke Unit [] Floor [] EMU [] RCU [] PCU

## 2021-10-24 LAB
ANION GAP SERPL CALC-SCNC: 11 MMOL/L — SIGNIFICANT CHANGE UP (ref 7–14)
APPEARANCE UR: CLEAR — SIGNIFICANT CHANGE UP
BILIRUB UR-MCNC: NEGATIVE — SIGNIFICANT CHANGE UP
BUN SERPL-MCNC: 18 MG/DL — SIGNIFICANT CHANGE UP (ref 10–20)
CALCIUM SERPL-MCNC: 8.7 MG/DL — SIGNIFICANT CHANGE UP (ref 8.5–10.1)
CHLORIDE SERPL-SCNC: 109 MMOL/L — SIGNIFICANT CHANGE UP (ref 98–110)
CO2 SERPL-SCNC: 21 MMOL/L — SIGNIFICANT CHANGE UP (ref 17–32)
COLOR SPEC: SIGNIFICANT CHANGE UP
CREAT SERPL-MCNC: 1 MG/DL — SIGNIFICANT CHANGE UP (ref 0.7–1.5)
DIFF PNL FLD: NEGATIVE — SIGNIFICANT CHANGE UP
GLUCOSE SERPL-MCNC: 107 MG/DL — HIGH (ref 70–99)
GLUCOSE UR QL: NEGATIVE — SIGNIFICANT CHANGE UP
HCT VFR BLD CALC: 43.5 % — SIGNIFICANT CHANGE UP (ref 42–52)
HGB BLD-MCNC: 14.4 G/DL — SIGNIFICANT CHANGE UP (ref 14–18)
KETONES UR-MCNC: NEGATIVE — SIGNIFICANT CHANGE UP
LEUKOCYTE ESTERASE UR-ACNC: NEGATIVE — SIGNIFICANT CHANGE UP
MAGNESIUM SERPL-MCNC: 2.3 MG/DL — SIGNIFICANT CHANGE UP (ref 1.8–2.4)
MCHC RBC-ENTMCNC: 29.1 PG — SIGNIFICANT CHANGE UP (ref 27–31)
MCHC RBC-ENTMCNC: 33.1 G/DL — SIGNIFICANT CHANGE UP (ref 32–37)
MCV RBC AUTO: 88.1 FL — SIGNIFICANT CHANGE UP (ref 80–94)
NITRITE UR-MCNC: NEGATIVE — SIGNIFICANT CHANGE UP
NRBC # BLD: 0 /100 WBCS — SIGNIFICANT CHANGE UP (ref 0–0)
PH UR: 7 — SIGNIFICANT CHANGE UP (ref 5–8)
PHOSPHATE SERPL-MCNC: 2.8 MG/DL — SIGNIFICANT CHANGE UP (ref 2.1–4.9)
PLATELET # BLD AUTO: 160 K/UL — SIGNIFICANT CHANGE UP (ref 130–400)
POTASSIUM SERPL-MCNC: 3.6 MMOL/L — SIGNIFICANT CHANGE UP (ref 3.5–5)
POTASSIUM SERPL-SCNC: 3.6 MMOL/L — SIGNIFICANT CHANGE UP (ref 3.5–5)
PROT ?TM UR-MCNC: 11 MG/DLG/24H — SIGNIFICANT CHANGE UP
PROT UR-MCNC: SIGNIFICANT CHANGE UP
RBC # BLD: 4.94 M/UL — SIGNIFICANT CHANGE UP (ref 4.7–6.1)
RBC # FLD: 14 % — SIGNIFICANT CHANGE UP (ref 11.5–14.5)
SODIUM SERPL-SCNC: 141 MMOL/L — SIGNIFICANT CHANGE UP (ref 135–146)
SP GR SPEC: 1.02 — SIGNIFICANT CHANGE UP (ref 1.01–1.03)
UROBILINOGEN FLD QL: SIGNIFICANT CHANGE UP
WBC # BLD: 5.48 K/UL — SIGNIFICANT CHANGE UP (ref 4.8–10.8)
WBC # FLD AUTO: 5.48 K/UL — SIGNIFICANT CHANGE UP (ref 4.8–10.8)

## 2021-10-24 PROCEDURE — 99291 CRITICAL CARE FIRST HOUR: CPT

## 2021-10-24 RX ORDER — POTASSIUM CHLORIDE 20 MEQ
20 PACKET (EA) ORAL
Refills: 0 | Status: COMPLETED | OUTPATIENT
Start: 2021-10-24 | End: 2021-10-24

## 2021-10-24 RX ORDER — LABETALOL HCL 100 MG
200 TABLET ORAL EVERY 8 HOURS
Refills: 0 | Status: DISCONTINUED | OUTPATIENT
Start: 2021-10-24 | End: 2021-10-26

## 2021-10-24 RX ORDER — CLEVIDIPINE BUTYRATE 50MG/100ML
1 VIAL (ML) INTRAVENOUS
Qty: 25 | Refills: 0 | Status: DISCONTINUED | OUTPATIENT
Start: 2021-10-24 | End: 2021-10-24

## 2021-10-24 RX ORDER — HYDRALAZINE HCL 50 MG
10 TABLET ORAL
Refills: 0 | Status: DISCONTINUED | OUTPATIENT
Start: 2021-10-24 | End: 2021-10-28

## 2021-10-24 RX ADMIN — Medication 200 MILLIGRAM(S): at 21:47

## 2021-10-24 RX ADMIN — Medication 2 MG/HR: at 09:05

## 2021-10-24 RX ADMIN — Medication 20 MILLIEQUIVALENT(S): at 12:55

## 2021-10-24 RX ADMIN — Medication 10 MILLIGRAM(S): at 17:06

## 2021-10-24 RX ADMIN — Medication 200 MILLIGRAM(S): at 12:57

## 2021-10-24 RX ADMIN — SODIUM CHLORIDE 50 MILLILITER(S): 9 INJECTION, SOLUTION INTRAVENOUS at 09:04

## 2021-10-24 NOTE — PROGRESS NOTE ADULT - SUBJECTIVE AND OBJECTIVE BOX
History of Present Illness:  Reason for Admission: double vision  History of Present Illness:   56-year-old male with PMHx HTN p/w diplopia since 2215 the night prior to admission, a/w pressure headache. Stroke code activated. CTH revealed no acute pathology, with multiple scattered patchy low attenuations in the bilateral periventricular cerebral white matter without mass effect, nonspecific, could reflect chronic microvascular ischemic changes, demyelinating disease, or leukoencephalopathy. CTA head/neck, no LVO, with mild atherosclerotic stenosis in the origin (less than 50%) and in the distal cavernous segment of the right ICA. CTP revealed calculated hypoperfusion in the right temporal lobe (4 mL) without perfusion evidence of core infarct. Decreased CBV, CBF, and increased Tmax in this region is felt to be artifactual. No evidence of corresponding CTA abnormality. In ED, HTN emergency, 264/135, started on Cleviprex for BP control. Admit to NCCU for optimization.      Review of Systems:  Review of Systems: Patient denies headache, nausea/vomiting, blurred vision, double vision, or dizziness. Otherwise, 10-point ROS is negative.    O/N- SBP stably coming down per parameters on clevi gtt          Physical Exam: EXAMINATION:  General: No acute distress  HEENT: Anicteric sclerae  Cardiac: [id number]  Lungs: Clear  Abdomen: Soft, non-tender, +BS  Extremities: No c/c/e  Skin/Incision Site: Clean, dry and intact  Neurologic: Awake, alert, fully oriented, follows commands, ptosis on Right eye, R cranial nerve 3 palsy, EOMI, face symmetric, tongue midline, no drift, full strength 5/5, sensation intact, no neglect        ICU Vital Signs Last 24 Hrs  T(C): 36.9 (24 Oct 2021 10:30), Max: 37.1 (23 Oct 2021 23:30)  T(F): 98.4 (24 Oct 2021 10:30), Max: 98.7 (23 Oct 2021 23:30)  HR: 64 (24 Oct 2021 10:30) (58 - 111)  BP: 210/117 (23 Oct 2021 18:30) (185/93 - 210/117)  BP(mean): --  ABP: 160/74 (24 Oct 2021 10:30) (134/65 - 210/87)  ABP(mean): 106 (23 Oct 2021 14:34) (106 - 106)  RR: 16 (24 Oct 2021 10:30) (16 - 18)  SpO2: 99% (24 Oct 2021 10:30) (98% - 99%)      10-23-21 @ 07:01  -  10-24-21 @ 07:00  --------------------------------------------------------  IN: 0 mL / OUT: 900 mL / NET: -900 mL    10-24-21 @ 07:01  -  10-24-21 @ 12:03  --------------------------------------------------------  IN: 0 mL / OUT: 700 mL / NET: -700 mL            acetaminophen     Tablet .. 650 milliGRAM(s) Oral every 6 hours PRN  chlorhexidine 4% Liquid 1 Application(s) Topical <User Schedule>  clevidipine Infusion 1 mG/Hr (2 mL/Hr) IV Continuous <Continuous>  enoxaparin Injectable 40 milliGRAM(s) SubCutaneous daily  labetalol 200 milliGRAM(s) Oral every 8 hours  multiple electrolytes Injection Type 1 1000 milliLiter(s) (50 mL/Hr) IV Continuous <Continuous>  potassium chloride    Tablet ER 20 milliEquivalent(s) Oral every 2 hours      LABS:  Na: 141 (10-24 @ 05:20), 145 (10-23 @ 07:17), 137 (10-22 @ 14:36)  K: 3.6 (10-24 @ 05:20), 3.7 (10-23 @ 07:17), 3.5 (10-22 @ 14:36)  Cl: 109 (10-24 @ 05:20), 107 (10-23 @ 07:17), 100 (10-22 @ 14:36)  CO2: 21 (10-24 @ 05:20), 24 (10-23 @ 07:17), 22 (10-22 @ 14:36)  BUN: 18 (10-24 @ 05:20), 18 (10-23 @ 07:17), 16 (10-22 @ 14:36)  Cr: 1.0 (10-24 @ 05:20), 1.1 (10-23 @ 07:17), 1.3 (10-22 @ 14:36)  Glu: 107(10-24 @ 05:20), 103(10-23 @ 07:17), 109(10-22 @ 14:36)    Hgb: 14.4 (10-24 @ 05:20), 15.4 (10-23 @ 07:17), 14.1 (10-22 @ 14:36)  Hct: 43.5 (10-24 @ 05:20), 47.8 (10-23 @ 07:17), 42.4 (10-22 @ 14:36)  WBC: 5.48 (10-24 @ 05:20), 4.21 (10-23 @ 07:17), 5.79 (10-22 @ 14:36)  Plt: 160 (10-24 @ 05:20), 173 (10-23 @ 07:17), 161 (10-22 @ 14:36)    INR: 1.15 10-22-21 @ 14:36  PTT: 32.1 10-22-21 @ 14:36          LIVER FUNCTIONS - ( 23 Oct 2021 07:17 )  Alb: 4.6 g/dL / Pro: 6.9 g/dL / ALK PHOS: 69 U/L / ALT: 15 U/L / AST: 40 U/L / GGT: x

## 2021-10-24 NOTE — PROGRESS NOTE ADULT - ASSESSMENT
ASSESSMENT: 56-year-old male with HTN p/w diplopia a/w pressure headache. In ED, HTN emergency, 264/135. CTH no acute pathology. no LVO. DDx Hypertensive emergency and r/o PRES. Admit to NCCU for optimization.     PLAN:   NEURO:  - Neuro checks q4hrs  - MRI/MRV completed  - Stroke core measures:   tox screen  Activity: [X] OOB as tolerated [] Bedrest [X] PT [X] OT [] PMNR    PULM:  RA     CV:  - Keep -160; labetalol PO and titrate off clevi gtt  - Telemetry monitoring  - 12-lead ECG  - cardiac enzymes 2 negative  - TTE/2D echo  - 12-lead ECG: LVH w/ Right BBB  secondary htn w/u      RENAL:  normal Na goal  replete electrolytes    GI:  Diet: DASH diet  GI prophylaxis [X] not indicated [] PPI [] other:  Bowel regimen [] colace [X] senna [] other:    ENDO:   - Goal euglycemia (-180)  - Send HgA1c 5.9    HEME/ONC:  VTE prophylaxis: [X] SCDs [x] chemoprophylaxis [] hold chemoprophylaxis due to: [] high risk of DVT/PE on admission due to:  -Lovenox 40mg q24hrs    ID:  - Keep normothermic, avoid fevers    MISC:  PT/OT    CODE STATUS:  [X] Full Code [] DNR [] DNI [] Palliative/Comfort Care    DISPOSITION:  [] ICU [] Stroke Unit [] Floor once off clevi gtt sustained [] EMU [] RCU [] PCU   ASSESSMENT: 56-year-old male with HTN p/w diplopia a/w pressure headache. In ED, HTN emergency, 264/135. CTH no acute pathology. no LVO. DDx Hypertensive emergency with cranial nerve III palsy and r/o PRES. Admit to NCCU for optimization.     PLAN:   NEURO:  - Neuro checks q4hrs  - MRI/MRV completed- no acuity  pt will likely need continued w/u with LP- CSF analysis, including inflammatory/rheum/immunologic source  - Stroke core measures:   tox screen  Activity: [X] OOB as tolerated [] Bedrest [X] PT [X] OT [] PMNR    PULM:  RA     CV:  - Keep -160; labetalol PO and titrate off clevi gtt  - Telemetry monitoring  - 12-lead ECG  - cardiac enzymes 2 negative  - TTE/2D echo  - 12-lead ECG: LVH w/ Right BBB  secondary htn w/u      RENAL:  normal Na goal  replete electrolytes    GI:  Diet: DASH diet  GI prophylaxis [X] not indicated [] PPI [] other:  Bowel regimen [] colace [X] senna [] other:    ENDO:   - Goal euglycemia (-180)  - Send HgA1c 5.9    HEME/ONC:  VTE prophylaxis: [X] SCDs [x] chemoprophylaxis [] hold chemoprophylaxis due to: [] high risk of DVT/PE on admission due to:  -Lovenox 40mg q24hrs    ID:  - Keep normothermic, avoid fevers    MISC:  PT/OT    CODE STATUS:  [X] Full Code [] DNR [] DNI [] Palliative/Comfort Care    DISPOSITION:  [] ICU [] Stroke Unit [] Floor once off clevi gtt sustained [] EMU [] RCU [] PCU   ASSESSMENT: 56-year-old male with HTN p/w diplopia a/w pressure headache. In ED, HTN emergency, 264/135. CTH no acute pathology. no LVO. DDx Hypertensive emergency with cranial nerve III palsy and r/o PRES. Admit to NCCU for optimization.     PLAN:   NEURO:  - Neuro checks q4hrs  - MRI/MRV completed- no acuity  pt will likely need continued w/u with angio to pcomm aneurysm, LP- CSF analysis, including inflammatory/rheum/immunologic source  - Stroke core measures:   tox screen  Activity: [X] OOB as tolerated [] Bedrest [X] PT [X] OT [] PMNR    PULM:  RA     CV:  - Keep -160; labetalol PO and titrate off clevi gtt  - Telemetry monitoring  - 12-lead ECG  - cardiac enzymes 2 negative  - TTE/2D echo  - 12-lead ECG: LVH w/ Right BBB  secondary htn w/u      RENAL:  normal Na goal  replete electrolytes    GI:  Diet: DASH diet  GI prophylaxis [X] not indicated [] PPI [] other:  Bowel regimen [] colace [X] senna [] other:    ENDO:   - Goal euglycemia (-180)  - Send HgA1c 5.9    HEME/ONC:  VTE prophylaxis: [X] SCDs [x] chemoprophylaxis [] hold chemoprophylaxis due to: [] high risk of DVT/PE on admission due to:  -Lovenox 40mg q24hrs    ID:  - Keep normothermic, avoid fevers    MISC:  PT/OT    CODE STATUS:  [X] Full Code [] DNR [] DNI [] Palliative/Comfort Care    DISPOSITION:  [] ICU [] Stroke Unit [] Floor once off clevi gtt sustained [] EMU [] RCU [] PCU

## 2021-10-24 NOTE — ED ADULT NURSE REASSESSMENT NOTE - NS ED NURSE REASSESS COMMENT FT1
pt received from previous shift  awake alert and interactive  denies any complaints  will continue care

## 2021-10-24 NOTE — ED ADULT NURSE REASSESSMENT NOTE - NS ED NURSE REASSESS COMMENT FT1
pt states that he does not want to take the hydralazine, MD made aware, states she will come down and speak with pt

## 2021-10-25 LAB
ALBUMIN SERPL ELPH-MCNC: 4.1 G/DL — SIGNIFICANT CHANGE UP (ref 3.5–5.2)
ALP SERPL-CCNC: 61 U/L — SIGNIFICANT CHANGE UP (ref 30–115)
ALT FLD-CCNC: 12 U/L — SIGNIFICANT CHANGE UP (ref 0–41)
AMPHET UR-MCNC: NEGATIVE — SIGNIFICANT CHANGE UP
ANION GAP SERPL CALC-SCNC: 13 MMOL/L — SIGNIFICANT CHANGE UP (ref 7–14)
AST SERPL-CCNC: 30 U/L — SIGNIFICANT CHANGE UP (ref 0–41)
BARBITURATES UR SCN-MCNC: NEGATIVE — SIGNIFICANT CHANGE UP
BASOPHILS # BLD AUTO: 0.03 K/UL — SIGNIFICANT CHANGE UP (ref 0–0.2)
BASOPHILS NFR BLD AUTO: 0.6 % — SIGNIFICANT CHANGE UP (ref 0–1)
BENZODIAZ UR-MCNC: NEGATIVE — SIGNIFICANT CHANGE UP
BILIRUB SERPL-MCNC: 1.3 MG/DL — HIGH (ref 0.2–1.2)
BUN SERPL-MCNC: 20 MG/DL — SIGNIFICANT CHANGE UP (ref 10–20)
CALCIUM SERPL-MCNC: 9.2 MG/DL — SIGNIFICANT CHANGE UP (ref 8.5–10.1)
CHLORIDE SERPL-SCNC: 105 MMOL/L — SIGNIFICANT CHANGE UP (ref 98–110)
CO2 SERPL-SCNC: 24 MMOL/L — SIGNIFICANT CHANGE UP (ref 17–32)
COCAINE METAB.OTHER UR-MCNC: NEGATIVE — SIGNIFICANT CHANGE UP
CREAT SERPL-MCNC: 1.2 MG/DL — SIGNIFICANT CHANGE UP (ref 0.7–1.5)
EOSINOPHIL # BLD AUTO: 0.05 K/UL — SIGNIFICANT CHANGE UP (ref 0–0.7)
EOSINOPHIL NFR BLD AUTO: 0.9 % — SIGNIFICANT CHANGE UP (ref 0–8)
GLUCOSE SERPL-MCNC: 108 MG/DL — HIGH (ref 70–99)
HCT VFR BLD CALC: 43.9 % — SIGNIFICANT CHANGE UP (ref 42–52)
HGB BLD-MCNC: 14.1 G/DL — SIGNIFICANT CHANGE UP (ref 14–18)
IMM GRANULOCYTES NFR BLD AUTO: 0.2 % — SIGNIFICANT CHANGE UP (ref 0.1–0.3)
LYMPHOCYTES # BLD AUTO: 1.15 K/UL — LOW (ref 1.2–3.4)
LYMPHOCYTES # BLD AUTO: 21.3 % — SIGNIFICANT CHANGE UP (ref 20.5–51.1)
MAGNESIUM SERPL-MCNC: 2.2 MG/DL — SIGNIFICANT CHANGE UP (ref 1.8–2.4)
MCHC RBC-ENTMCNC: 28.8 PG — SIGNIFICANT CHANGE UP (ref 27–31)
MCHC RBC-ENTMCNC: 32.1 G/DL — SIGNIFICANT CHANGE UP (ref 32–37)
MCV RBC AUTO: 89.8 FL — SIGNIFICANT CHANGE UP (ref 80–94)
METHADONE UR-MCNC: NEGATIVE — SIGNIFICANT CHANGE UP
MONOCYTES # BLD AUTO: 0.39 K/UL — SIGNIFICANT CHANGE UP (ref 0.1–0.6)
MONOCYTES NFR BLD AUTO: 7.2 % — SIGNIFICANT CHANGE UP (ref 1.7–9.3)
NEUTROPHILS # BLD AUTO: 3.78 K/UL — SIGNIFICANT CHANGE UP (ref 1.4–6.5)
NEUTROPHILS NFR BLD AUTO: 69.8 % — SIGNIFICANT CHANGE UP (ref 42.2–75.2)
NRBC # BLD: 0 /100 WBCS — SIGNIFICANT CHANGE UP (ref 0–0)
OPIATES UR-MCNC: NEGATIVE — SIGNIFICANT CHANGE UP
PCP SPEC-MCNC: SIGNIFICANT CHANGE UP
PHOSPHATE SERPL-MCNC: 3.6 MG/DL — SIGNIFICANT CHANGE UP (ref 2.1–4.9)
PLATELET # BLD AUTO: 159 K/UL — SIGNIFICANT CHANGE UP (ref 130–400)
POTASSIUM SERPL-MCNC: 3.7 MMOL/L — SIGNIFICANT CHANGE UP (ref 3.5–5)
POTASSIUM SERPL-SCNC: 3.7 MMOL/L — SIGNIFICANT CHANGE UP (ref 3.5–5)
PROPOXYPHENE QUALITATIVE URINE RESULT: NEGATIVE — SIGNIFICANT CHANGE UP
PROT SERPL-MCNC: 6.2 G/DL — SIGNIFICANT CHANGE UP (ref 6–8)
RBC # BLD: 4.89 M/UL — SIGNIFICANT CHANGE UP (ref 4.7–6.1)
RBC # FLD: 14.1 % — SIGNIFICANT CHANGE UP (ref 11.5–14.5)
SODIUM SERPL-SCNC: 142 MMOL/L — SIGNIFICANT CHANGE UP (ref 135–146)
TSH SERPL-MCNC: 0.88 UIU/ML — SIGNIFICANT CHANGE UP (ref 0.27–4.2)
WBC # BLD: 5.41 K/UL — SIGNIFICANT CHANGE UP (ref 4.8–10.8)
WBC # FLD AUTO: 5.41 K/UL — SIGNIFICANT CHANGE UP (ref 4.8–10.8)

## 2021-10-25 PROCEDURE — 93975 VASCULAR STUDY: CPT | Mod: 26

## 2021-10-25 PROCEDURE — 99232 SBSQ HOSP IP/OBS MODERATE 35: CPT

## 2021-10-25 PROCEDURE — 99233 SBSQ HOSP IP/OBS HIGH 50: CPT

## 2021-10-25 RX ORDER — HYDRALAZINE HCL 50 MG
100 TABLET ORAL EVERY 8 HOURS
Refills: 0 | Status: DISCONTINUED | OUTPATIENT
Start: 2021-10-25 | End: 2021-10-25

## 2021-10-25 RX ORDER — SODIUM CHLORIDE 9 MG/ML
1000 INJECTION INTRAMUSCULAR; INTRAVENOUS; SUBCUTANEOUS
Refills: 0 | Status: DISCONTINUED | OUTPATIENT
Start: 2021-10-26 | End: 2021-10-26

## 2021-10-25 RX ORDER — NIFEDIPINE 30 MG
90 TABLET, EXTENDED RELEASE 24 HR ORAL DAILY
Refills: 0 | Status: DISCONTINUED | OUTPATIENT
Start: 2021-10-25 | End: 2021-10-28

## 2021-10-25 RX ORDER — CLEVIDIPINE BUTYRATE 50MG/100ML
5 VIAL (ML) INTRAVENOUS
Qty: 25 | Refills: 0 | Status: DISCONTINUED | OUTPATIENT
Start: 2021-10-25 | End: 2021-10-25

## 2021-10-25 RX ORDER — POTASSIUM CHLORIDE 20 MEQ
20 PACKET (EA) ORAL ONCE
Refills: 0 | Status: COMPLETED | OUTPATIENT
Start: 2021-10-25 | End: 2021-10-25

## 2021-10-25 RX ORDER — INFLUENZA VIRUS VACCINE 15; 15; 15; 15 UG/.5ML; UG/.5ML; UG/.5ML; UG/.5ML
0.5 SUSPENSION INTRAMUSCULAR ONCE
Refills: 0 | Status: DISCONTINUED | OUTPATIENT
Start: 2021-10-25 | End: 2021-10-28

## 2021-10-25 RX ORDER — HYDRALAZINE HCL 50 MG
50 TABLET ORAL EVERY 8 HOURS
Refills: 0 | Status: DISCONTINUED | OUTPATIENT
Start: 2021-10-25 | End: 2021-10-28

## 2021-10-25 RX ADMIN — Medication 10 MILLIGRAM(S): at 00:27

## 2021-10-25 RX ADMIN — Medication 50 MILLIGRAM(S): at 14:13

## 2021-10-25 RX ADMIN — Medication 10 MILLIGRAM(S): at 10:15

## 2021-10-25 RX ADMIN — Medication 50 MILLIGRAM(S): at 22:22

## 2021-10-25 RX ADMIN — CHLORHEXIDINE GLUCONATE 1 APPLICATION(S): 213 SOLUTION TOPICAL at 06:39

## 2021-10-25 RX ADMIN — Medication 50 MILLIEQUIVALENT(S): at 09:30

## 2021-10-25 RX ADMIN — Medication 200 MILLIGRAM(S): at 05:26

## 2021-10-25 RX ADMIN — Medication 90 MILLIGRAM(S): at 11:58

## 2021-10-25 NOTE — PROGRESS NOTE ADULT - ASSESSMENT
56-year-old male with HTN p/w diplopia a/w pressure headache. In ED, HTN emergency, 264/135. CTH no acute pathology. no LVO. DDx Hypertensive emergency with cranial nerve III palsy and r/o PRES. Admit to NCCU for optimization.     PLAN:   NEURO:  - Neuro checks q4hrs  - MRI/MRV completed- no acuity  pt will likely need continued w/u with angio to pcomm aneurysm, LP- CSF analysis, including inflammatory/rheum/immunologic source  - Stroke core measures:   tox screen  Activity: [X] OOB as tolerated [] Bedrest [X] PT [X] OT [] PMNR    PULM:  RA     CV:  - Keep -160; labetalol PO and titrate off clevi gtt  - Telemetry monitoring  - 12-lead ECG  - cardiac enzymes 2 negative  - TTE/2D echo  - 12-lead ECG: LVH w/ Right BBB  secondary htn w/u      RENAL:  normal Na goal  replete electrolytes    GI:  Diet: DASH diet  GI prophylaxis [X] not indicated [] PPI [] other:  Bowel regimen [] colace [X] senna [] other:    ENDO:   - Goal euglycemia (-180)  - Send HgA1c 5.9    HEME/ONC:  VTE prophylaxis: [X] SCDs [x] chemoprophylaxis [] hold chemoprophylaxis due to: [] high risk of DVT/PE on admission due to:  -Lovenox 40mg q24hrs    ID:  - Keep normothermic, avoid fevers    MISC:  PT/OT    CODE STATUS:  [X] Full Code [] DNR [] DNI [] Palliative/Comfort Care    DISPOSITION:  [x] ICU [] Stroke Unit [] Floor once off clevi gtt sustained [] EMU [] RCU [] PCU 56-year-old male with HTN p/w diplopia a/w pressure headache. In ED, HTN emergency, 264/135. CTH no acute pathology. no LVO. DDx Hypertensive emergency with cranial nerve III palsy and r/o PRES. Admit to NCCU for optimization.     PLAN:   NEURO:  - Neuro checks q4hrs  - MRI/MRV completed- no acuity  pt will likely need continued w/u with angio to pcomm aneurysm- neuroendovascular c/s  May also need LP- CSF analysis, including inflammatory/rheum/immunologic source  - Stroke core measures:   tox screen negative  Activity: [X] OOB as tolerated [] Bedrest [X] PT [X] OT [] PMNR    PULM:  RA     CV:  - Keep -160; labetalol, nifedipine, hydralazine PO  - Telemetry monitoring  - 12-lead ECG  - cardiac enzymes 2 negative  - TTE/2D echo  - 12-lead ECG: LVH w/ Right BBB  secondary htn w/u      RENAL:  normal Na goal  replete electrolytes    GI:  Diet: DASH diet  GI prophylaxis [X] not indicated [] PPI [] other:  Bowel regimen [] colace [X] senna [] other:    ENDO:   - Goal euglycemia (-180)  - Send HgA1c 5.9    HEME/ONC:  VTE prophylaxis: [X] SCDs [x] chemoprophylaxis  -Lovenox 40mg q24hrs    ID:  - Keep normothermic, avoid fevers    MISC:  PT/OT    CODE STATUS:  [X] Full Code [] DNR [] DNI [] Palliative/Comfort Care    DISPOSITION:  [x] ICU [] Stroke Unit [] Floor once off clevi gtt sustained [] EMU [] RCU [] PCU

## 2021-10-25 NOTE — CONSULT NOTE ADULT - SUBJECTIVE AND OBJECTIVE BOX
Neuroendovascular    Patient is a 56y old  Male who presents with a chief complaint of double vision (25 Oct 2021 02:01)      HPI:  56-year-old male with PMHx HTN p/w diplopia since  the night prior to admission, a/w pressure headache. Stroke code activated. CTH revealed no acute pathology, with multiple scattered patchy low attenuations in the bilateral periventricular cerebral white matter without mass effect, nonspecific, could reflect chronic microvascular ischemic changes, demyelinating disease, or leukoencephalopathy. CTA head/neck, no LVO, with mild atherosclerotic stenosis in the origin (less than 50%) and in the distal cavernous segment of the right ICA. CTP revealed calculated hypoperfusion in the right temporal lobe (4 mL) without perfusion evidence of core infarct. Decreased CBV, CBF, and increased Tmax in this region is felt to be artifactual. No evidence of corresponding CTA abnormality. In ED, HTN emergency, 264/135, started on Cleviprex for BP control. Admit to NCCU for optimization.  Neuroendovascular consult called for investigation for PCOMM aneurysm.     PAST MEDICAL & SURGICAL HISTORY:  Hypertension        FAMILY HISTORY:      Social History: (-) x 3    Allergies    No Known Allergies    Intolerances        MEDICATIONS  (STANDING):  chlorhexidine 4% Liquid 1 Application(s) Topical <User Schedule>  clevidipine Infusion 5 mG/Hr (10 mL/Hr) IV Continuous <Continuous>  enoxaparin Injectable 40 milliGRAM(s) SubCutaneous daily  influenza   Vaccine 0.5 milliLiter(s) IntraMuscular once  labetalol 200 milliGRAM(s) Oral every 8 hours  NIFEdipine XL 90 milliGRAM(s) Oral daily    MEDICATIONS  (PRN):  acetaminophen     Tablet .. 650 milliGRAM(s) Oral every 6 hours PRN Temp greater or equal to 38C (100.4F), Mild Pain (1 - 3)  hydrALAZINE Injectable 10 milliGRAM(s) IV Push every 2 hours PRN SBP > 160  Vital Signs Last 24 Hrs  T(C): 36.2 (25 Oct 2021 04:00), Max: 36.2 (25 Oct 2021 04:00)  T(F): 97.1 (25 Oct 2021 04:00), Max: 97.1 (25 Oct 2021 04:00)  HR: 64 (25 Oct 2021 10:00) (52 - 75)  BP: 183/108 (25 Oct 2021 10:00) (153/95 - 196/105)  BP(mean): 144 (25 Oct 2021 10:00) (110 - 144)  RR: 16 (25 Oct 2021 10:00) (11 - 39)  SpO2: 100% (25 Oct 2021 10:00) (97% - 100%)    Examination:  General:  Appearance is consistent with chronologic age.  No abnormal facies.  Gross skin survey within normal limits.    Cognitive/Language:  The patient is oriented to person, place, time and date.  Recent and remote memory intact.  Fund of knowledge is intact and normal.  Language with normal repetition, comprehension and naming.  Nondysarthric.    Eyes: r eye ptosis 3rd nerve palsy  Face:  Facial sensation normal V1 - 3, no facial asymmetry.    Motor examination:   Normal tone, bulk and range of motion.  No tenderness, twitching, tremors or involuntary movements.  Formal Muscle Strength Testing: (MRC grade R/L) 5/5 UE; 5/5 LE.  No observable drift.  Sensory examination:   Intact to light touch and pinprick, pain, temperature and proprioception and vibration in all extremities.  Cerebellum:   FTN/HKS intact with normal SMITA in all limbs.  No dysmetria or dysdiadokinesia.  Gait deferred    Labs:   CBC Full  -  ( 25 Oct 2021 05:00 )  WBC Count : 5.41 K/uL  RBC Count : 4.89 M/uL  Hemoglobin : 14.1 g/dL  Hematocrit : 43.9 %  Platelet Count - Automated : 159 K/uL  Mean Cell Volume : 89.8 fL  Mean Cell Hemoglobin : 28.8 pg  Mean Cell Hemoglobin Concentration : 32.1 g/dL  Auto Neutrophil # : 3.78 K/uL  Auto Lymphocyte # : 1.15 K/uL  Auto Monocyte # : 0.39 K/uL  Auto Eosinophil # : 0.05 K/uL  Auto Basophil # : 0.03 K/uL  Auto Neutrophil % : 69.8 %  Auto Lymphocyte % : 21.3 %  Auto Monocyte % : 7.2 %  Auto Eosinophil % : 0.9 %  Auto Basophil % : 0.6 %    10-25    142  |  105  |  20  ----------------------------<  108<H>  3.7   |  24  |  1.2    Ca    9.2      25 Oct 2021 05:00  Phos  3.6     10-25  Mg     2.2     10-25    TPro  6.2  /  Alb  4.1  /  TBili  1.3<H>  /  DBili  x   /  AST  30  /  ALT  12  /  AlkPhos  61  10-25    LIVER FUNCTIONS - ( 25 Oct 2021 05:00 )  Alb: 4.1 g/dL / Pro: 6.2 g/dL / ALK PHOS: 61 U/L / ALT: 12 U/L / AST: 30 U/L / GGT: x             Urinalysis Basic - ( 24 Oct 2021 09:10 )    Color: Light Yellow / Appearance: Clear / S.019 / pH: x  Gluc: x / Ketone: Negative  / Bili: Negative / Urobili: <2 mg/dL   Blood: x / Protein: Trace / Nitrite: Negative   Leuk Esterase: Negative / RBC: x / WBC x   Sq Epi: x / Non Sq Epi: x / Bacteria: x          Neuroimaging:  NCHCT:   < from: MR Head w/wo IV Cont (10.23.21 @ 19:14) >    MRV BRAIN:    Dural Venous Sinuses: Patent    Internal Cerebral Veins and Vein of [place]: Patent    IMPRESSION:    No acute intracranial abnormalities. Extensive areas of abnormal T2 prolongation in the periventricular white matter regions described above.    Unremarkable MRV of the brain.      < end of copied text >  < from: CT Angio Neck w/ IV Cont (10.22.21 @ 14:54) >    IMPRESSION:    CT PERFUSION:  Calculated hypoperfusion in the right temporal lobe (4 mL) without perfusion evidence of core infarct. Decreased CBV, CBF, and increased Tmax in this region is felt to be artifactual. No evidence of corresponding CTA abnormality.      CTA HEAD/NECK:  No evidence of large vessel occlusion, aneurysm, vascular malformation.    Mild atherosclerotic stenosis in the origin (less than 50%) and in the distal cavernous segment of the right ICA.    --- End of Report ---            < end of copied text >    10-25-21 @ 11:17

## 2021-10-25 NOTE — OCCUPATIONAL THERAPY INITIAL EVALUATION ADULT - LIVES WITH, PROFILE
pt lives alone in [place] in private home, however upon d/c from hospital will stay with fiance in [facility].  no ERMELINDA, +stairs to bedroom/full bathroom. +bathtub/significant other

## 2021-10-25 NOTE — PHARMACOTHERAPY INTERVENTION NOTE - COMMENTS
pt received nifedipine XL 90mg x1 & hydralazine 10mg IV x1,   -hydralazine 100mg po q8h stat dose, d/w neuro crit team to adjust to hydralazine 50mg po q8h

## 2021-10-25 NOTE — CHART NOTE - NSCHARTNOTEFT_GEN_A_CORE
Neuro ICU Transfer Note    Transfer from: Neuro ICU  Transfer to:  (  ) Medicine    (  ) Telemetry    (  ) SDU       (  ) Stroke Unit    (  ) _______________  Accepting physician:      MICU COURSE: 55 y/o p/w 1 day Hx of diplopia on 10/22, BP 260s on arrival. 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 Initial CTH/CTA negative. Started on Cleviprex gtt. MRI/MRV completed, no acute changes, chronic white matter changes noted. Transitioned to oral antihypertensives. Evaluated by Neuroendovascular for DSA to r/o pcomm aneurysm compressing nerve. Secondary HTN workup pending.          ASSESSMENT & PLAN:   56-year-old male with HTN p/w diplopia a/w pressure headache. In ED, HTN emergency, 264/135. CTH no acute pathology. no LVO. DDx Hypertensive emergency with cranial nerve III palsy and r/o PRES.    PLAN:   NEURO:  - Neuro checks q4hrs  - MRI/MRV completed- no acuity  pt will likely need continued w/u with angio to pcomm aneurysm- neuroendovascular c/s  May also need LP- CSF analysis, including inflammatory/rheum/immunologic source  - Stroke core measures:   tox screen negative  Activity: [X] OOB as tolerated [] Bedrest [X] PT [X] OT [] PMNR    PULM:  RA     CV:  - Keep -160; labetalol, nifedipine, hydralazine PO  - Telemetry monitoring  - 12-lead ECG  - cardiac enzymes 2 negative  - TTE/2D echo  - 12-lead ECG: LVH w/ Right BBB  secondary htn w/u      RENAL:  normal Na goal  replete electrolytes    GI:  Diet: DASH diet  GI prophylaxis [X] not indicated [] PPI [] other:  Bowel regimen [] colace [X] senna [] other:    ENDO:   - Goal euglycemia (-180)  - Send HgA1c 5.9    HEME/ONC:  VTE prophylaxis: [X] SCDs [x] chemoprophylaxis  -Lovenox 40mg q24hrs    ID:  - Keep normothermic, avoid fevers    MISC:  PT/OT        For Follow-Up:  [ ] Neuroendovascular c/s for DSA- possibly 10/26, NPO after MN tonight  [ ] Ophthalmology c/s  [ ] Duplex kidneys  [ ] TTE  [ ] 24hr urine catecholamines  [ ] Plasma metanephrine  [ ] Aldosterone/renin        Vital Signs Last 24 Hrs  T(C): 36.7 (25 Oct 2021 12:00), Max: 36.7 (25 Oct 2021 12:00)  T(F): 98.1 (25 Oct 2021 12:00), Max: 98.1 (25 Oct 2021 12:00)  HR: 70 (25 Oct 2021 17:00) (52 - 92)  BP: 128/74 (25 Oct 2021 16:30) (128/74 - 196/105)  BP(mean): 93 (25 Oct 2021 16:30) (89 - 144)  RR: 20 (25 Oct 2021 17:00) (11 - 39)  SpO2: 99% (25 Oct 2021 17:00) (97% - 100%)  I&O's Summary    24 Oct 2021 07:01  -  25 Oct 2021 07:00  --------------------------------------------------------  IN: 200 mL / OUT: 1400 mL / NET: -1200 mL    25 Oct 2021 07:01  -  25 Oct 2021 17:22  --------------------------------------------------------  IN: 600 mL / OUT: 350 mL / NET: 250 mL          MEDICATIONS  (STANDING):  chlorhexidine 4% Liquid 1 Application(s) Topical <User Schedule>  enoxaparin Injectable 40 milliGRAM(s) SubCutaneous daily  hydrALAZINE 50 milliGRAM(s) Oral every 8 hours  influenza   Vaccine 0.5 milliLiter(s) IntraMuscular once  labetalol 200 milliGRAM(s) Oral every 8 hours  NIFEdipine XL 90 milliGRAM(s) Oral daily    MEDICATIONS  (PRN):  acetaminophen     Tablet .. 650 milliGRAM(s) Oral every 6 hours PRN Temp greater or equal to 38C (100.4F), Mild Pain (1 - 3)  hydrALAZINE Injectable 10 milliGRAM(s) IV Push every 2 hours PRN SBP > 160        LABS                                            14.1                  Neurophils% (auto):   69.8   (10-25 @ 05:00):    5.41 )-----------(159          Lymphocytes% (auto):  21.3                                          43.9                   Eosinphils% (auto):   0.9      Manual%: Neutrophils x    ; Lymphocytes x    ; Eosinophils x    ; Bands%: x    ; Blasts x                                    142    |  105    |  20                  Calcium: 9.2   / iCa: x      (10-25 @ 05:00)    ----------------------------<  108       Magnesium: 2.2                              3.7     |  24     |  1.2              Phosphorous: 3.6      TPro  6.2    /  Alb  4.1    /  TBili  1.3    /  DBili  x      /  AST  30     /  ALT  12     /  AlkPhos  61     25 Oct 2021 05:00 Neuro ICU Transfer Note    Transfer from: Neuro ICU  Transfer to:  (  ) Medicine    (  ) Telemetry    ( x ) SDU  CEU     (  ) Stroke Unit    (  ) _______________  Accepting physician: Dr. Fontenot      MICU COURSE: 57 y/o p/w 1 day Hx of diplopia on 10/22, BP 260s on arrival. 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 Initial CTH/CTA negative. Started on Cleviprex gtt. MRI/MRV completed, no acute changes, chronic white matter changes noted. Transitioned to oral antihypertensives. Evaluated by [facility] for DSA to r/o pcomm aneurysm compressing nerve. Secondary HTN workup pending.          ASSESSMENT & PLAN:   56-year-old male with HTN p/w diplopia a/w pressure headache. In ED, HTN emergency, 264/135. CTH no acute pathology. no LVO. DDx Hypertensive emergency with cranial nerve III palsy and r/o PRES.    PLAN:   NEURO:  - Neuro checks q4hrs  - MRI/MRV completed- no acuity  pt will likely need continued w/u with angio to pcomm aneurysm- neuroendovascular c/s  May also need LP- CSF analysis, including inflammatory/rheum/immunologic source  - Stroke core measures:   tox screen negative  Activity: [X] OOB as tolerated [] Bedrest [X] PT [X] OT [] PMNR    PULM:  RA     CV:  - Keep -160; labetalol, nifedipine, hydralazine PO  - Telemetry monitoring  - 12-lead ECG  - cardiac enzymes 2 negative  - TTE/2D echo  - 12-lead ECG: LVH w/ Right BBB  secondary htn w/u      RENAL:  normal Na goal  replete electrolytes    GI:  Diet: DASH diet  GI prophylaxis [X] not indicated [] PPI [] other:  Bowel regimen [] colace [X] senna [] other:    ENDO:   - Goal euglycemia (-180)  - Send HgA1c 5.9    HEME/ONC:  VTE prophylaxis: [X] SCDs [x] chemoprophylaxis  -Lovenox 40mg q24hrs    ID:  - Keep normothermic, avoid fevers    MISC:  PT/OT        For Follow-Up:  [ ] Neuroendovascular c/s for DSA- possibly 10/26, NPO after MN tonight  [ ] Ophthalmology c/s  [ ] Duplex kidneys  [ ] TTE  [ ] 24hr urine catecholamines  [ ] Plasma metanephrine  [ ] Aldosterone/renin        Vital Signs Last 24 Hrs  T(C): 36.7 (25 Oct 2021 12:00), Max: 36.7 (25 Oct 2021 12:00)  T(F): 98.1 (25 Oct 2021 12:00), Max: 98.1 (25 Oct 2021 12:00)  HR: 70 (25 Oct 2021 17:00) (52 - 92)  BP: 128/74 (25 Oct 2021 16:30) (128/74 - 196/105)  BP(mean): 93 (25 Oct 2021 16:30) (89 - 144)  RR: 20 (25 Oct 2021 17:00) (11 - 39)  SpO2: 99% (25 Oct 2021 17:00) (97% - 100%)  I&O's Summary    24 Oct 2021 07:01  -  25 Oct 2021 07:00  --------------------------------------------------------  IN: 200 mL / OUT: 1400 mL / NET: -1200 mL    25 Oct 2021 07:01  -  25 Oct 2021 17:22  --------------------------------------------------------  IN: 600 mL / OUT: 350 mL / NET: 250 mL          MEDICATIONS  (STANDING):  chlorhexidine 4% Liquid 1 Application(s) Topical <User Schedule>  enoxaparin Injectable 40 milliGRAM(s) SubCutaneous daily  hydrALAZINE 50 milliGRAM(s) Oral every 8 hours  influenza   Vaccine 0.5 milliLiter(s) IntraMuscular once  labetalol 200 milliGRAM(s) Oral every 8 hours  NIFEdipine XL 90 milliGRAM(s) Oral daily    MEDICATIONS  (PRN):  acetaminophen     Tablet .. 650 milliGRAM(s) Oral every 6 hours PRN Temp greater or equal to 38C (100.4F), Mild Pain (1 - 3)  hydrALAZINE Injectable 10 milliGRAM(s) IV Push every 2 hours PRN SBP > 160        LABS                                            14.1                  Neurophils% (auto):   69.8   (10-25 @ 05:00):    5.41 )-----------(159          Lymphocytes% (auto):  21.3                                          43.9                   Eosinphils% (auto):   0.9      Manual%: Neutrophils x    ; Lymphocytes x    ; Eosinophils x    ; Bands%: x    ; Blasts x                                    142    |  105    |  20                  Calcium: 9.2   / iCa: x      (10-25 @ 05:00)    ----------------------------<  108       Magnesium: 2.2                              3.7     |  24     |  1.2              Phosphorous: 3.6      TPro  6.2    /  Alb  4.1    /  TBili  1.3    /  DBili  x      /  AST  30     /  ALT  12     /  AlkPhos  61     25 Oct 2021 05:00 Neuro ICU Transfer Note    Transfer from: Neuro ICU  Transfer to:  (  ) Medicine    (  ) Telemetry    ( x ) SDU  CEU     (  ) Stroke Unit    (  ) _______________  Accepting physician: Dr. Galvan (10/26)      MICU COURSE: 57 y/o p/w 1 day Hx of diplopia on 10/22, BP 260s on arrival. 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 Initial CTH/CTA negative. Started on Cleviprex gtt. MRI/MRV completed, no acute changes, chronic white matter changes noted. Transitioned to oral antihypertensives. Evaluated by [facility] for DSA to r/o pcomm aneurysm compressing nerve. Secondary HTN workup pending.          ASSESSMENT & PLAN:   56-year-old male with HTN p/w diplopia a/w pressure headache. In ED, HTN emergency, 264/135. CTH no acute pathology. no LVO. DDx Hypertensive emergency with cranial nerve III palsy and r/o PRES.    PLAN:   NEURO:  - Neuro checks q4hrs  - MRI/MRV completed- no acuity  pt will likely need continued w/u with angio to pcomm aneurysm- neuroendovascular c/s  May also need LP- CSF analysis, including inflammatory/rheum/immunologic source  - Stroke core measures:   tox screen negative  Activity: [X] OOB as tolerated [] Bedrest [X] PT [X] OT [] PMNR    PULM:  RA     CV:  - Keep -160; labetalol, nifedipine, hydralazine PO  - Telemetry monitoring  - 12-lead ECG  - cardiac enzymes 2 negative  - TTE/2D echo  - 12-lead ECG: LVH w/ Right BBB  secondary htn w/u      RENAL:  normal Na goal  replete electrolytes    GI:  Diet: DASH diet  GI prophylaxis [X] not indicated [] PPI [] other:  Bowel regimen [] colace [X] senna [] other:    ENDO:   - Goal euglycemia (-180)  - Send HgA1c 5.9    HEME/ONC:  VTE prophylaxis: [X] SCDs [x] chemoprophylaxis  -Lovenox 40mg q24hrs    ID:  - Keep normothermic, avoid fevers    MISC:  PT/OT        For Follow-Up:  [ ] Neuroendovascular c/s for DSA- possibly 10/26, NPO after MN tonight  [ ] Ophthalmology c/s  [ ] Duplex kidneys  [ ] TTE  [ ] 24hr urine catecholamines  [ ] Plasma metanephrine  [ ] Aldosterone/renin        Vital Signs Last 24 Hrs  T(C): 36.7 (25 Oct 2021 12:00), Max: 36.7 (25 Oct 2021 12:00)  T(F): 98.1 (25 Oct 2021 12:00), Max: 98.1 (25 Oct 2021 12:00)  HR: 70 (25 Oct 2021 17:00) (52 - 92)  BP: 128/74 (25 Oct 2021 16:30) (128/74 - 196/105)  BP(mean): 93 (25 Oct 2021 16:30) (89 - 144)  RR: 20 (25 Oct 2021 17:00) (11 - 39)  SpO2: 99% (25 Oct 2021 17:00) (97% - 100%)  I&O's Summary    24 Oct 2021 07:01  -  25 Oct 2021 07:00  --------------------------------------------------------  IN: 200 mL / OUT: 1400 mL / NET: -1200 mL    25 Oct 2021 07:01  -  25 Oct 2021 17:22  --------------------------------------------------------  IN: 600 mL / OUT: 350 mL / NET: 250 mL          MEDICATIONS  (STANDING):  chlorhexidine 4% Liquid 1 Application(s) Topical <User Schedule>  enoxaparin Injectable 40 milliGRAM(s) SubCutaneous daily  hydrALAZINE 50 milliGRAM(s) Oral every 8 hours  influenza   Vaccine 0.5 milliLiter(s) IntraMuscular once  labetalol 200 milliGRAM(s) Oral every 8 hours  NIFEdipine XL 90 milliGRAM(s) Oral daily    MEDICATIONS  (PRN):  acetaminophen     Tablet .. 650 milliGRAM(s) Oral every 6 hours PRN Temp greater or equal to 38C (100.4F), Mild Pain (1 - 3)  hydrALAZINE Injectable 10 milliGRAM(s) IV Push every 2 hours PRN SBP > 160        LABS                                            14.1                  Neurophils% (auto):   69.8   (10-25 @ 05:00):    5.41 )-----------(159          Lymphocytes% (auto):  21.3                                          43.9                   Eosinphils% (auto):   0.9      Manual%: Neutrophils x    ; Lymphocytes x    ; Eosinophils x    ; Bands%: x    ; Blasts x                                    142    |  105    |  20                  Calcium: 9.2   / iCa: x      (10-25 @ 05:00)    ----------------------------<  108       Magnesium: 2.2                              3.7     |  24     |  1.2              Phosphorous: 3.6      TPro  6.2    /  Alb  4.1    /  TBili  1.3    /  DBili  x      /  AST  30     /  ALT  12     /  AlkPhos  61     25 Oct 2021 05:00

## 2021-10-25 NOTE — OCCUPATIONAL THERAPY INITIAL EVALUATION ADULT - VISUAL ACUITY
wears RX reading glasses at baseline (not present on evaluation, OT educated pt to have s/o bring glasses) c/o double vision with visual tracking/saccading activities

## 2021-10-25 NOTE — OCCUPATIONAL THERAPY INITIAL EVALUATION ADULT - GENERAL OBSERVATIONS, REHAB EVAL
Pt received semifowler in bed in NAD, +tele + BP cuff, +pulse oxi, +L wrist A line, agreeable to OT evaluation, requested to return to bed following evaluation, left semi hammond in bed all lines intact, vitals stable RN aware

## 2021-10-25 NOTE — OCCUPATIONAL THERAPY INITIAL EVALUATION ADULT - COGNITIVE, VISUAL PERCEPTUAL, OT EVAL
Pt will perform visual scanning and tracking activities without complaints of dizziness or double vision across 3 sessions

## 2021-10-25 NOTE — OCCUPATIONAL THERAPY INITIAL EVALUATION ADULT - ADL RETRAINING, OT EVAL
Patient will perform lower body dressing independently with use of appropriate adaptive equipment as needed by discharge. ;

## 2021-10-25 NOTE — PROGRESS NOTE ADULT - SUBJECTIVE AND OBJECTIVE BOX
SUMMARY: 56-year-old male with PMHx HTN p/w diplopia since 2215 the night prior to admission, a/w pressure headache. Stroke code activated. CTH revealed no acute pathology, with multiple scattered patchy low attenuations in the bilateral periventricular cerebral white matter without mass effect, nonspecific, could reflect chronic microvascular ischemic changes, demyelinating disease, or leukoencephalopathy. CTA head/neck, no LVO, with mild atherosclerotic stenosis in the origin (less than 50%) and in the distal cavernous segment of the right ICA. CTP revealed calculated hypoperfusion in the right temporal lobe (4 mL) without perfusion evidence of core infarct. Decreased CBV, CBF, and increased Tmax in this region is felt to be artifactual. No evidence of corresponding CTA abnormality. In ED, HTN emergency, 264/135, started on Cleviprex for BP control. Admit to NCCU for optimization.     OVERNIGHT EVENTS: Patient transferred to the NICU from the ED. No acute overnight events     ADMISSION SCORES:   NIHSS: 0    REVIEW OF SYSTEMS: Diplopia right eye     VITALS: [x] Reviewed    IMAGING/DATA: [x] Reviewed    IVF FLUIDS/MEDICATIONS: [x] Reviewed    ALLERGIES:   No Known Allergies    DEVICES:   [] Restraints [x] PIVs [] ET tube [] central line [] PICC [x] arterial line [] maxwell [] NGT/OGT [] EVD [] LD [] TRIP/HMV [] LiCOX [] ICP monitor [] Trach [] PEG [] Chest Tube [] other:    EXAMINATION:  General: No acute distress  HEENT: Anicteric sclerae  Cardiac: [id number]  Lungs: Clear  Abdomen: Soft, non-tender, +BS  Extremities: No c/c/e  Skin/Incision Site: Clean, dry and intact  Neurologic:       ICU Vital Signs Last 24 Hrs  T(C): 35.8 (25 Oct 2021 00:00), Max: 37.1 (24 Oct 2021 03:30)  T(F): 96.5 (25 Oct 2021 00:00), Max: 98.7 (24 Oct 2021 03:30)  HR: 68 (25 Oct 2021 01:30) (54 - 90)  BP: 153/95 (25 Oct 2021 01:00) (153/95 - 196/105)  BP(mean): 110 (25 Oct 2021 01:00) (110 - 133)  ABP: 138/80 (25 Oct 2021 01:30) (104/96 - 193/92)  ABP(mean): 100 (25 Oct 2021 01:30) (100 - 132)  RR: 17 (25 Oct 2021 01:30) (15 - 25)  SpO2: 99% (25 Oct 2021 01:30) (98% - 100%)      10-23-21 @ 07:01  -  10-24-21 @ 07:00  --------------------------------------------------------  IN: 0 mL / OUT: 900 mL / NET: -900 mL    10-24-21 @ 07:01  -  10-25-21 @ 02:01  --------------------------------------------------------  IN: 0 mL / OUT: 1200 mL / NET: -1200 mL    acetaminophen     Tablet .. 650 milliGRAM(s) Oral every 6 hours PRN  chlorhexidine 4% Liquid 1 Application(s) Topical <User Schedule>  enoxaparin Injectable 40 milliGRAM(s) SubCutaneous daily  hydrALAZINE Injectable 10 milliGRAM(s) IV Push every 2 hours PRN  labetalol 200 milliGRAM(s) Oral every 8 hours    LABS:  Na: 141 (10-24 @ 05:20), 145 (10-23 @ 07:17), 137 (10-22 @ 14:36)  K: 3.6 (10-24 @ 05:20), 3.7 (10-23 @ 07:17), 3.5 (10-22 @ 14:36)  Cl: 109 (10-24 @ 05:20), 107 (10-23 @ 07:17), 100 (10-22 @ 14:36)  CO2: 21 (10-24 @ 05:20), 24 (10-23 @ 07:17), 22 (10-22 @ 14:36)  BUN: 18 (10-24 @ 05:20), 18 (10-23 @ 07:17), 16 (10-22 @ 14:36)  Cr: 1.0 (10-24 @ 05:20), 1.1 (10-23 @ 07:17), 1.3 (10-22 @ 14:36)  Glu: 107(10-24 @ 05:20), 103(10-23 @ 07:17), 109(10-22 @ 14:36)    Hgb: 14.4 (10-24 @ 05:20), 15.4 (10-23 @ 07:17), 14.1 (10-22 @ 14:36)  Hct: 43.5 (10-24 @ 05:20), 47.8 (10-23 @ 07:17), 42.4 (10-22 @ 14:36)  WBC: 5.48 (10-24 @ 05:20), 4.21 (10-23 @ 07:17), 5.79 (10-22 @ 14:36)  Plt: 160 (10-24 @ 05:20), 173 (10-23 @ 07:17), 161 (10-22 @ 14:36)    INR: 1.15 10-22-21 @ 14:36  PTT: 32.1 10-22-21 @ 14:36    LIVER FUNCTIONS - ( 23 Oct 2021 07:17 )  Alb: 4.6 g/dL / Pro: 6.9 g/dL / ALK PHOS: 69 U/L / ALT: 15 U/L / AST: 40 U/L / GGT: x                SUMMARY: 56-year-old male with PMHx HTN p/w diplopia since 2215 the night prior to admission, a/w pressure headache. Stroke code activated. CTH revealed no acute pathology, with multiple scattered patchy low attenuations in the bilateral periventricular cerebral white matter without mass effect, nonspecific, could reflect chronic microvascular ischemic changes, demyelinating disease, or leukoencephalopathy. CTA head/neck, no LVO, with mild atherosclerotic stenosis in the origin (less than 50%) and in the distal cavernous segment of the right ICA. CTP revealed calculated hypoperfusion in the right temporal lobe (4 mL) without perfusion evidence of core infarct. Decreased CBV, CBF, and increased Tmax in this region is felt to be artifactual. No evidence of corresponding CTA abnormality. In ED, HTN emergency, 264/135, started on Cleviprex for BP control. Admit to NCCU for optimization.     OVERNIGHT EVENTS: Patient transferred to the NICU from the ED. No acute overnight events     ADMISSION SCORES:   NIHSS: 0    REVIEW OF SYSTEMS: Diplopia right eye     VITALS: [x] Reviewed    IMAGING/DATA: [x] Reviewed    IVF FLUIDS/MEDICATIONS: [x] Reviewed    ALLERGIES:   No Known Allergies    DEVICES:   [] Restraints [x] PIVs [] ET tube [] central line [] PICC [x] arterial line [] maxwell [] NGT/OGT [] EVD [] LD [] TRIP/HMV [] LiCOX [] ICP monitor [] Trach [] PEG [] Chest Tube [] other:    EXAMINATION:  General: No acute distress  HEENT: Anicteric sclerae  Cardiac: [id number]  Lungs: Clear  Abdomen: Soft, non-tender, +BS  Extremities: No c/c/e  Skin/Incision Site: Clean, dry and intact  Neurologic: Awake, alert, fully oriented, follows commands, ptosis on Right eye, R cranial nerve 3 palsy, EOMI, face symmetric, tongue midline, no drift, full strength 5/5, sensation intact, no neglect              ICU Vital Signs Last 24 Hrs  T(C): 36.2 (25 Oct 2021 08:00), Max: 36.2 (25 Oct 2021 04:00)  T(F): 97.1 (25 Oct 2021 08:00), Max: 97.1 (25 Oct 2021 04:00)  HR: 80 (25 Oct 2021 11:00) (52 - 80)  BP: 183/108 (25 Oct 2021 10:00) (153/95 - 196/105)  BP(mean): 144 (25 Oct 2021 10:00) (110 - 144)  ABP: 144/78 (25 Oct 2021 11:00) (104/96 - 193/92)  ABP(mean): 98 (25 Oct 2021 11:00) (76 - 132)  RR: 24 (25 Oct 2021 11:00) (11 - 39)  SpO2: 100% (25 Oct 2021 11:00) (97% - 100%)      10-24-21 @ 07:01  -  10-25-21 @ 07:00  --------------------------------------------------------  IN: 200 mL / OUT: 1400 mL / NET: -1200 mL    10-25-21 @ 07:01  -  10-25-21 @ 14:19  --------------------------------------------------------  IN: 400 mL / OUT: 200 mL / NET: 200 mL            acetaminophen     Tablet .. 650 milliGRAM(s) Oral every 6 hours PRN  chlorhexidine 4% Liquid 1 Application(s) Topical <User Schedule>  clevidipine Infusion 5 mG/Hr (10 mL/Hr) IV Continuous <Continuous>  enoxaparin Injectable 40 milliGRAM(s) SubCutaneous daily  hydrALAZINE 50 milliGRAM(s) Oral every 8 hours  hydrALAZINE Injectable 10 milliGRAM(s) IV Push every 2 hours PRN  influenza   Vaccine 0.5 milliLiter(s) IntraMuscular once  labetalol 200 milliGRAM(s) Oral every 8 hours  NIFEdipine XL 90 milliGRAM(s) Oral daily      LABS:  Na: 142 (10-25 @ 05:00), 141 (10-24 @ 05:20), 145 (10-23 @ 07:17), 137 (10-22 @ 14:36)  K: 3.7 (10-25 @ 05:00), 3.6 (10-24 @ 05:20), 3.7 (10-23 @ 07:17), 3.5 (10-22 @ 14:36)  Cl: 105 (10-25 @ 05:00), 109 (10-24 @ 05:20), 107 (10-23 @ 07:17), 100 (10-22 @ 14:36)  CO2: 24 (10-25 @ 05:00), 21 (10-24 @ 05:20), 24 (10-23 @ 07:17), 22 (10-22 @ 14:36)  BUN: 20 (10-25 @ 05:00), 18 (10-24 @ 05:20), 18 (10-23 @ 07:17), 16 (10-22 @ 14:36)  Cr: 1.2 (10-25 @ 05:00), 1.0 (10-24 @ 05:20), 1.1 (10-23 @ 07:17), 1.3 (10-22 @ 14:36)  Glu: 108(10-25 @ 05:00), 107(10-24 @ 05:20), 103(10-23 @ 07:17), 109(10-22 @ 14:36)    Hgb: 14.1 (10-25 @ 05:00), 14.4 (10-24 @ 05:20), 15.4 (10-23 @ 07:17), 14.1 (10-22 @ 14:36)  Hct: 43.9 (10-25 @ 05:00), 43.5 (10-24 @ 05:20), 47.8 (10-23 @ 07:17), 42.4 (10-22 @ 14:36)  WBC: 5.41 (10-25 @ 05:00), 5.48 (10-24 @ 05:20), 4.21 (10-23 @ 07:17), 5.79 (10-22 @ 14:36)  Plt: 159 (10-25 @ 05:00), 160 (10-24 @ 05:20), 173 (10-23 @ 07:17), 161 (10-22 @ 14:36)    INR: 1.15 10-22-21 @ 14:36  PTT: 32.1 10-22-21 @ 14:36          LIVER FUNCTIONS - ( 25 Oct 2021 05:00 )  Alb: 4.1 g/dL / Pro: 6.2 g/dL / ALK PHOS: 61 U/L / ALT: 12 U/L / AST: 30 U/L / GGT: x

## 2021-10-25 NOTE — CONSULT NOTE ADULT - ASSESSMENT
Impression:  56-year-old male with PMHx HTN p/w diplopia since 2215 the night prior to admission, a/w pressure headache. Stroke code activated. CTA head/neck, no LVO. No evidence of corresponding CTA abnormality. In ED, HTN emergency, 264/135. Currently managed by the neuro critical care team. Neuroendovascular team consulted NeuroENDOvascular team to investigate for neurovascular cause of third nerve palsy.     Suggestion:  Patient agreed to undergo diagnostic cerebral angiogram.   Will coordinate when IR suite available.   Management as per NCC.

## 2021-10-25 NOTE — CONSULT NOTE ADULT - ATTENDING COMMENTS
Patient seen and examined during stroke code and agree with above except as noted.  Patient had double vision since the night before and exam shows right eye adduction defect and ptosis with some partial 3rd nerve deficits on left eye  SBP >260 on arrival and discussed case with neuroicu and will be admitted to neuroicu for BP and neuro management    Plan as above
Patient with acute onset of right 3rd nerve palsy with negative CTA and MRV/A for vascular abnormality pressing the nerve is for diagnostic cerebral angiogram per NCC Team, Dr. Vail.

## 2021-10-26 ENCOUNTER — APPOINTMENT (OUTPATIENT)
Dept: NEUROLOGY | Facility: HOSPITAL | Age: 56
End: 2021-10-26
Payer: MEDICAID

## 2021-10-26 LAB
ALDOST SERPL-MCNC: 9.4 NG/DL — SIGNIFICANT CHANGE UP
ANION GAP SERPL CALC-SCNC: 12 MMOL/L — SIGNIFICANT CHANGE UP (ref 7–14)
APTT BLD: 29.6 SEC — SIGNIFICANT CHANGE UP (ref 27–39.2)
BUN SERPL-MCNC: 20 MG/DL — SIGNIFICANT CHANGE UP (ref 10–20)
CALCIUM SERPL-MCNC: 8.7 MG/DL — SIGNIFICANT CHANGE UP (ref 8.5–10.1)
CHLORIDE SERPL-SCNC: 109 MMOL/L — SIGNIFICANT CHANGE UP (ref 98–110)
CO2 SERPL-SCNC: 20 MMOL/L — SIGNIFICANT CHANGE UP (ref 17–32)
CREAT SERPL-MCNC: 1 MG/DL — SIGNIFICANT CHANGE UP (ref 0.7–1.5)
GLUCOSE SERPL-MCNC: 110 MG/DL — HIGH (ref 70–99)
HCT VFR BLD CALC: 43.2 % — SIGNIFICANT CHANGE UP (ref 42–52)
HGB BLD-MCNC: 14.1 G/DL — SIGNIFICANT CHANGE UP (ref 14–18)
INR BLD: 1.06 RATIO — SIGNIFICANT CHANGE UP (ref 0.65–1.3)
MAGNESIUM SERPL-MCNC: 2.1 MG/DL — SIGNIFICANT CHANGE UP (ref 1.8–2.4)
MCHC RBC-ENTMCNC: 28.9 PG — SIGNIFICANT CHANGE UP (ref 27–31)
MCHC RBC-ENTMCNC: 32.6 G/DL — SIGNIFICANT CHANGE UP (ref 32–37)
MCV RBC AUTO: 88.5 FL — SIGNIFICANT CHANGE UP (ref 80–94)
NRBC # BLD: 0 /100 WBCS — SIGNIFICANT CHANGE UP (ref 0–0)
PHOSPHATE SERPL-MCNC: 2.7 MG/DL — SIGNIFICANT CHANGE UP (ref 2.1–4.9)
PLATELET # BLD AUTO: 161 K/UL — SIGNIFICANT CHANGE UP (ref 130–400)
POTASSIUM SERPL-MCNC: 3.7 MMOL/L — SIGNIFICANT CHANGE UP (ref 3.5–5)
POTASSIUM SERPL-SCNC: 3.7 MMOL/L — SIGNIFICANT CHANGE UP (ref 3.5–5)
PROTHROM AB SERPL-ACNC: 12.2 SEC — SIGNIFICANT CHANGE UP (ref 9.95–12.87)
RBC # BLD: 4.88 M/UL — SIGNIFICANT CHANGE UP (ref 4.7–6.1)
RBC # FLD: 14.4 % — SIGNIFICANT CHANGE UP (ref 11.5–14.5)
RENIN DIRECT, PLASMA: 4.2 PG/ML — SIGNIFICANT CHANGE UP
SODIUM SERPL-SCNC: 141 MMOL/L — SIGNIFICANT CHANGE UP (ref 135–146)
WBC # BLD: 5.74 K/UL — SIGNIFICANT CHANGE UP (ref 4.8–10.8)
WBC # FLD AUTO: 5.74 K/UL — SIGNIFICANT CHANGE UP (ref 4.8–10.8)

## 2021-10-26 PROCEDURE — 36226 PLACE CATH VERTEBRAL ART: CPT | Mod: RT

## 2021-10-26 PROCEDURE — 76937 US GUIDE VASCULAR ACCESS: CPT | Mod: 26

## 2021-10-26 PROCEDURE — 36227 PLACE CATH XTRNL CAROTID: CPT

## 2021-10-26 PROCEDURE — 76377 3D RENDER W/INTRP POSTPROCES: CPT | Mod: 26

## 2021-10-26 PROCEDURE — 99232 SBSQ HOSP IP/OBS MODERATE 35: CPT

## 2021-10-26 PROCEDURE — 99233 SBSQ HOSP IP/OBS HIGH 50: CPT

## 2021-10-26 PROCEDURE — 36224 PLACE CATH CAROTD ART: CPT | Mod: 50

## 2021-10-26 PROCEDURE — 36225 PLACE CATH SUBCLAVIAN ART: CPT | Mod: 59,LT

## 2021-10-26 RX ORDER — KETOROLAC TROMETHAMINE 30 MG/ML
15 SYRINGE (ML) INJECTION ONCE
Refills: 0 | Status: COMPLETED | OUTPATIENT
Start: 2021-10-26 | End: 2021-10-26

## 2021-10-26 RX ADMIN — Medication 50 MILLIGRAM(S): at 16:04

## 2021-10-26 RX ADMIN — Medication 50 MILLIGRAM(S): at 05:44

## 2021-10-26 RX ADMIN — CHLORHEXIDINE GLUCONATE 1 APPLICATION(S): 213 SOLUTION TOPICAL at 05:42

## 2021-10-26 RX ADMIN — Medication 50 MILLIGRAM(S): at 22:00

## 2021-10-26 RX ADMIN — SODIUM CHLORIDE 75 MILLILITER(S): 9 INJECTION INTRAMUSCULAR; INTRAVENOUS; SUBCUTANEOUS at 00:01

## 2021-10-26 NOTE — PROGRESS NOTE ADULT - ASSESSMENT
57 y/o p/w 1 day Hx of diplopia on 10/22, BP 260s on arrival. 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 Initial CTH/CTA negative. Started on Cleviprex gtt. MRI/MRV completed, no acute changes, chronic white matter changes noted. Transitioned to oral antihypertensives. Evaluated by [facility] for DSA to r/o pcomm aneurysm compressing nerve. Secondary HTN workup pending.        #diplopia  - Neuro checks q4hrs  - CT brain stroke protocol -> no acute events  - MRI/MRV completed- no acuity  - angiogram today (10/26)  - f/u opthalmology    #hypertensive emergency -   mmHg  - placed initially on cleviprex drip -> oral  - Keep -160; labetalol, nifedipine, hydralazine PO  - Telemetry monitoring  - 12-lead ECG  - cardiac enzymes 2 negative  - TTE/2D echo  - 12-lead ECG: LVH w/ Right BBB  - f/u secondary htn w/u (Duplex kidneys, TTE, 24hr urine catecholamines, Plasma metanephrine, Aldosterone/renin)        #MISC:  - DVT proph: lovenox  - GI proph: not needed  - diet: DASH  - code: full       57 y/o p/w 1 day Hx of diplopia on 10/22, BP 260s on arrival. 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 Initial CTH/CTA negative. Started on Cleviprex gtt. MRI/MRV completed, no acute changes, chronic white matter changes noted. Transitioned to oral antihypertensives. Evaluated by [facility] for DSA to r/o pcomm aneurysm compressing nerve. Secondary HTN workup pending.    #diplopia  - Neuro checks q4hrs  - CT brain stroke protocol -> no acute events  - MRI/MRV completed- no acuity  - angiogram today (10/26)  - f/u opthalmology    #hypertensive emergency -   mmHg  - placed initially on cleviprex drip -> oral  - Keep -160; labetalol, nifedipine, hydralazine PO  - Telemetry monitoring  - 12-lead ECG  - cardiac enzymes 2 negative  - TTE/2D echo  - 12-lead ECG: LVH w/ Right BBB  - f/u secondary htn w/u (Duplex kidneys, TTE, 24hr urine catecholamines, Plasma metanephrine, Aldosterone/renin)        #MISC:  - DVT proph: lovenox  - GI proph: not needed  - diet: DASH  - code: full

## 2021-10-26 NOTE — CHART NOTE - NSCHARTNOTEFT_GEN_A_CORE
PACU ANESTHESIA ADMISSION NOTE      Procedure:   Post op diagnosis:      ____  Intubated  TV:______       Rate: ______      FiO2: ______    __x__  Patent Airway    __x__  Full return of protective reflexes    __x__  Full recovery from anesthesia / back to baseline     Vitals:   T: 97.2          R: 18                 BP: 143/90                 Sat: 99                  P: 72      Mental Status:  _x___ Awake   __x___ Alert   _____ Drowsy   _____ Sedated    Nausea/Vomiting:  _x___ NO  ______Yes,   __x__ See Post - Op Orders          Pain Scale (0-10):  __0___    Treatment: ____ None    __x__ See Post - Op/PCA Orders    Post - Operative Fluids:   ____ Oral   __x__ See Post - Op Orders    Plan: Discharge:   ____Home       ___x__Floor     _____Critical Care    _____  Other:_________________    Comments: When parameters met.

## 2021-10-26 NOTE — CHART NOTE - NSCHARTNOTEFT_GEN_A_CORE
Patient is s/p DSA today to r/o intracranial aneurysm in the setting of 3d cranial nerve palsy.  Patient tolerated the procedure well, no complications.  Patient's neuro status is unchanged.    There were no intracranial aneurysms identified in Pcomm region. However moderate to severe atherosclerosis seen in the Vesels.      PLan:  Follow post procedure protocol for groin checks, distal pulses checks, vitals and neurochecks X4 hours  Keep RLE straight for 4 hours  Continue care per medicine and neuro team  Opthalmology consult  Keep normotension  Please  find detailed report in Pacs

## 2021-10-26 NOTE — PROGRESS NOTE ADULT - SUBJECTIVE AND OBJECTIVE BOX
TERESE BECKER 56y Male  MRN#: [id number]   CODE STATUS:________    Hospital Day: 4d    Pt is currently admitted with the primary diagnosis of diplopia    SUBJECTIVE    No Overnight events     patient complains of right eye pain described as pressure-like.    Present Today:   - Jyoti:  No                                          ----------------------------------------------------------  OBJECTIVE  PAST MEDICAL & SURGICAL HISTORY  Hypertension                                              -----------------------------------------------------------  ALLERGIES:  No Known Allergies                                            ------------------------------------------------------------    HOME MEDICATIONS  Home Medications:  aspirin 81 mg oral tablet, chewable: 1 tab(s) orally once a day (23 Oct 2021 02:36)  beet root: 2 tab(s) orally once a day (23 Oct 2021 02:36)                           MEDICATIONS:  STANDING MEDICATIONS  chlorhexidine 4% Liquid 1 Application(s) Topical <User Schedule>  enoxaparin Injectable 40 milliGRAM(s) SubCutaneous daily  hydrALAZINE 50 milliGRAM(s) Oral every 8 hours  influenza   Vaccine 0.5 milliLiter(s) IntraMuscular once  labetalol 200 milliGRAM(s) Oral every 8 hours  NIFEdipine XL 90 milliGRAM(s) Oral daily  sodium chloride 0.9%. 1000 milliLiter(s) IV Continuous <Continuous>    PRN MEDICATIONS  acetaminophen     Tablet .. 650 milliGRAM(s) Oral every 6 hours PRN  hydrALAZINE Injectable 10 milliGRAM(s) IV Push every 2 hours PRN                                            ------------------------------------------------------------  VITAL SIGNS: Last 24 Hours  T(C): 36.4 (26 Oct 2021 03:50), Max: 36.7 (25 Oct 2021 12:00)  T(F): 97.5 (26 Oct 2021 03:50), Max: 98.1 (25 Oct 2021 12:00)  HR: 88 (26 Oct 2021 03:50) (64 - 110)  BP: 131/69 (26 Oct 2021 03:50) (112/59 - 183/108)  BP(mean): 90 (26 Oct 2021 02:00) (74 - 144)  RR: 18 (26 Oct 2021 03:50) (12 - 33)  SpO2: 100% (26 Oct 2021 03:50) (96% - 100%)      10-25-21 @ 07:01  -  10-26-21 @ 07:00  --------------------------------------------------------  IN: 1220 mL / OUT: 500 mL / NET: 720 mL                                             --------------------------------------------------------------  LABS:                        14.1   5.74  )-----------( 161      ( 26 Oct 2021 08:46 )             43.2     10-25    142  |  105  |  20  ----------------------------<  108<H>  3.7   |  24  |  1.2    Ca    9.2      25 Oct 2021 05:00  Phos  3.6     10-25  Mg     2.2     10-25    TPro  6.2  /  Alb  4.1  /  TBili  1.3<H>  /  DBili  x   /  AST  30  /  ALT  12  /  AlkPhos  61  10-25                                                              -------------------------------------------------------------  RADIOLOGY:                                            --------------------------------------------------------------    PHYSICAL EXAM:  General: NAD  LUNGS: clear bilateral air entry  HEART: reg S1S2  ABDOMEN: soft non tender non distended  EXT: no lower limb edema bilaterally  NEURO: right eyelid ptosis.                              TERESE BECKER 56y Male  MRN#: [id number]   CODE STATUS:________    Hospital Day: 4d    Pt is currently admitted with the primary diagnosis of diplopia    SUBJECTIVE    No Overnight events     patient complains of right eye pain described as pressure-like.    Present Today:   - Jyoti:  No                                          ----------------------------------------------------------  OBJECTIVE  PAST MEDICAL & SURGICAL HISTORY  Hypertension                                              -----------------------------------------------------------  ALLERGIES:  No Known Allergies                                            ------------------------------------------------------------    HOME MEDICATIONS  Home Medications:  aspirin 81 mg oral tablet, chewable: 1 tab(s) orally once a day (23 Oct 2021 02:36)  beet root: 2 tab(s) orally once a day (23 Oct 2021 02:36)                           MEDICATIONS:  STANDING MEDICATIONS  chlorhexidine 4% Liquid 1 Application(s) Topical <User Schedule>  enoxaparin Injectable 40 milliGRAM(s) SubCutaneous daily  hydrALAZINE 50 milliGRAM(s) Oral every 8 hours  influenza   Vaccine 0.5 milliLiter(s) IntraMuscular once  labetalol 200 milliGRAM(s) Oral every 8 hours  NIFEdipine XL 90 milliGRAM(s) Oral daily  sodium chloride 0.9%. 1000 milliLiter(s) IV Continuous <Continuous>    PRN MEDICATIONS  acetaminophen     Tablet .. 650 milliGRAM(s) Oral every 6 hours PRN  hydrALAZINE Injectable 10 milliGRAM(s) IV Push every 2 hours PRN                                            ------------------------------------------------------------  VITAL SIGNS: Last 24 Hours  T(C): 36.4 (26 Oct 2021 03:50), Max: 36.7 (25 Oct 2021 12:00)  T(F): 97.5 (26 Oct 2021 03:50), Max: 98.1 (25 Oct 2021 12:00)  HR: 88 (26 Oct 2021 03:50) (64 - 110)  BP: 131/69 (26 Oct 2021 03:50) (112/59 - 183/108)  BP(mean): 90 (26 Oct 2021 02:00) (74 - 144)  RR: 18 (26 Oct 2021 03:50) (12 - 33)  SpO2: 100% (26 Oct 2021 03:50) (96% - 100%)      10-25-21 @ 07:01  -  10-26-21 @ 07:00  --------------------------------------------------------  IN: 1220 mL / OUT: 500 mL / NET: 720 mL                                             --------------------------------------------------------------  LABS:                        14.1   5.74  )-----------( 161      ( 26 Oct 2021 08:46 )             43.2     10-25    142  |  105  |  20  ----------------------------<  108<H>  3.7   |  24  |  1.2    Ca    9.2      25 Oct 2021 05:00  Phos  3.6     10-25  Mg     2.2     10-25    TPro  6.2  /  Alb  4.1  /  TBili  1.3<H>  /  DBili  x   /  AST  30  /  ALT  12  /  AlkPhos  61  10-25                                            -------------------------------------------------------------  RADIOLOGY:                                            --------------------------------------------------------------    PHYSICAL EXAM:  General: NAD  LUNGS: clear bilateral air entry  HEART: reg S1S2  ABDOMEN: soft non tender non distended  EXT: no lower limb edema bilaterally  NEURO: right eyelid ptosis.

## 2021-10-27 LAB
ANION GAP SERPL CALC-SCNC: 13 MMOL/L — SIGNIFICANT CHANGE UP (ref 7–14)
BASOPHILS # BLD AUTO: 0.02 K/UL — SIGNIFICANT CHANGE UP (ref 0–0.2)
BASOPHILS NFR BLD AUTO: 0.3 % — SIGNIFICANT CHANGE UP (ref 0–1)
BUN SERPL-MCNC: 24 MG/DL — HIGH (ref 10–20)
CALCIUM SERPL-MCNC: 9.2 MG/DL — SIGNIFICANT CHANGE UP (ref 8.5–10.1)
CHLORIDE SERPL-SCNC: 108 MMOL/L — SIGNIFICANT CHANGE UP (ref 98–110)
CO2 SERPL-SCNC: 19 MMOL/L — SIGNIFICANT CHANGE UP (ref 17–32)
CREAT SERPL-MCNC: 1.3 MG/DL — SIGNIFICANT CHANGE UP (ref 0.7–1.5)
EOSINOPHIL # BLD AUTO: 0.02 K/UL — SIGNIFICANT CHANGE UP (ref 0–0.7)
EOSINOPHIL NFR BLD AUTO: 0.3 % — SIGNIFICANT CHANGE UP (ref 0–8)
GLUCOSE SERPL-MCNC: 122 MG/DL — HIGH (ref 70–99)
HCT VFR BLD CALC: 43.2 % — SIGNIFICANT CHANGE UP (ref 42–52)
HGB BLD-MCNC: 13.9 G/DL — LOW (ref 14–18)
IMM GRANULOCYTES NFR BLD AUTO: 0.3 % — SIGNIFICANT CHANGE UP (ref 0.1–0.3)
LYMPHOCYTES # BLD AUTO: 0.76 K/UL — LOW (ref 1.2–3.4)
LYMPHOCYTES # BLD AUTO: 13.1 % — LOW (ref 20.5–51.1)
MAGNESIUM SERPL-MCNC: 2.2 MG/DL — SIGNIFICANT CHANGE UP (ref 1.8–2.4)
MCHC RBC-ENTMCNC: 28.7 PG — SIGNIFICANT CHANGE UP (ref 27–31)
MCHC RBC-ENTMCNC: 32.2 G/DL — SIGNIFICANT CHANGE UP (ref 32–37)
MCV RBC AUTO: 89.1 FL — SIGNIFICANT CHANGE UP (ref 80–94)
MONOCYTES # BLD AUTO: 0.29 K/UL — SIGNIFICANT CHANGE UP (ref 0.1–0.6)
MONOCYTES NFR BLD AUTO: 5 % — SIGNIFICANT CHANGE UP (ref 1.7–9.3)
NEUTROPHILS # BLD AUTO: 4.7 K/UL — SIGNIFICANT CHANGE UP (ref 1.4–6.5)
NEUTROPHILS NFR BLD AUTO: 81 % — HIGH (ref 42.2–75.2)
NRBC # BLD: 0 /100 WBCS — SIGNIFICANT CHANGE UP (ref 0–0)
PLATELET # BLD AUTO: 175 K/UL — SIGNIFICANT CHANGE UP (ref 130–400)
POTASSIUM SERPL-MCNC: 3.9 MMOL/L — SIGNIFICANT CHANGE UP (ref 3.5–5)
POTASSIUM SERPL-SCNC: 3.9 MMOL/L — SIGNIFICANT CHANGE UP (ref 3.5–5)
RBC # BLD: 4.85 M/UL — SIGNIFICANT CHANGE UP (ref 4.7–6.1)
RBC # FLD: 14.6 % — HIGH (ref 11.5–14.5)
SODIUM SERPL-SCNC: 140 MMOL/L — SIGNIFICANT CHANGE UP (ref 135–146)
WBC # BLD: 5.81 K/UL — SIGNIFICANT CHANGE UP (ref 4.8–10.8)
WBC # FLD AUTO: 5.81 K/UL — SIGNIFICANT CHANGE UP (ref 4.8–10.8)

## 2021-10-27 PROCEDURE — 93306 TTE W/DOPPLER COMPLETE: CPT | Mod: 26

## 2021-10-27 PROCEDURE — 99233 SBSQ HOSP IP/OBS HIGH 50: CPT

## 2021-10-27 PROCEDURE — 99232 SBSQ HOSP IP/OBS MODERATE 35: CPT

## 2021-10-27 PROCEDURE — 99252 IP/OBS CONSLTJ NEW/EST SF 35: CPT

## 2021-10-27 RX ADMIN — CHLORHEXIDINE GLUCONATE 1 APPLICATION(S): 213 SOLUTION TOPICAL at 08:23

## 2021-10-27 RX ADMIN — Medication 50 MILLIGRAM(S): at 15:38

## 2021-10-27 RX ADMIN — Medication 50 MILLIGRAM(S): at 05:27

## 2021-10-27 RX ADMIN — Medication 90 MILLIGRAM(S): at 05:27

## 2021-10-27 RX ADMIN — Medication 50 MILLIGRAM(S): at 21:07

## 2021-10-27 NOTE — PROGRESS NOTE ADULT - ASSESSMENT
57 y/o p/w 1 day Hx of diplopia on 10/22, BP 260s on arrival. 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 Initial CTH/CTA negative. Started on Cleviprex gtt. MRI/MRV completed, no acute changes, chronic white matter changes noted. Transitioned to oral antihypertensives. Evaluated by [facility] for DSA to r/o pcomm aneurysm compressing nerve. Secondary HTN workup pending.    #diplopia  - Neuro checks q4hrs  - CT brain stroke protocol -> no acute events  - MRI/MRV completed- no acuity  - angiogram done (10/26) -> moderate to severe atherosclerosis, no aneurysms  - f/u opthalmology  - f/u TTE    #hypertensive emergency -   mmHg  - placed initially on cleviprex drip -> oral  - Keep -160; labetalol, nifedipine, hydralazine PO  - Telemetry monitoring  - 12-lead ECG  - cardiac enzymes 2 negative  - TTE/2D echo  - 12-lead ECG: LVH w/ Right BBB  - f/u secondary htn w/u (Duplex kidneys, TTE, 24hr urine catecholamines, Plasma metanephrine, Aldosterone/renin)        #MISC:  - DVT proph: lovenox  - GI proph: not needed  - diet: DASH  - code: full   57 y/o p/w 1 day Hx of diplopia on 10/22, BP 260s on arrival. 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 Initial CTH/CTA negative. Started on Cleviprex gtt. MRI/MRV completed, no acute changes, chronic white matter changes noted. Transitioned to oral antihypertensives. Evaluated by [facility] for DSA to r/o pcomm aneurysm compressing nerve. Secondary HTN workup pending.    #diplopia  - Neuro checks q4hrs  - CT brain stroke protocol -> no acute events  - MRI/MRV completed- no acuity  - angiogram done (10/26) -> moderate to severe atherosclerosis, no aneurysms  - f/u opthalmology -> recommend outpatient neuro-ophtalmology  - f/u neurology -> myasthenia gravis antibodies/ neuro-opht/ MRI orbits  - f/u TTE    #hypertensive emergency -   mmHg  - placed initially on cleviprex drip -> oral  - Keep -160; labetalol, nifedipine, hydralazine PO  - Telemetry monitoring  - 12-lead ECG  - cardiac enzymes 2 negative  - TTE/2D echo  - 12-lead ECG: LVH w/ Right BBB  - f/u secondary htn w/u (Duplex kidneys, TTE, 24hr urine catecholamines, Plasma metanephrine, Aldosterone/renin)        #MISC:  - DVT proph: lovenox  - GI proph: not needed  - diet: DASH  - code: full

## 2021-10-27 NOTE — PROGRESS NOTE ADULT - ASSESSMENT
57 y/o p/w 1 day Hx of diplopia on 10/22, BP 260s on arrival. 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 Initial CTH/CTA negative. Started on Cleviprex gtt. MRI/MRV completed, no acute changes, chronic white matter changes noted. Transitioned to oral antihypertensives. Evaluated by [facility] for DSA to r/o pcomm aneurysm compressing nerve. Secondary HTN workup pending.    #diplopia/ptosis due to CN III Palsy?  - continue neuro checks q4hrs  - CT brain stroke protocol - no acute events  - MRI/MRV - no acute pathology   - angiogram done (10/26) -> moderate to severe atherosclerosis, no aneurysms  - opthalmology eval appreciated - outpatient neuro-ophthalmology recommended   - await neurology follow up today   - Echo done today - report pending     #hypertensive emergency -   mmHg  - initially placed on cleviprex drip   - Keep -160;  - currently on nifedipine and hydralazine; labetalol stopped??  - continue tele   - echo done today - report pending   - [facility] duplex of b/l kidneys - no stenosis    Progress Note Handoff  Pending Consults: Neuro follow up   Pending Tests: none  Pending Results: none  Family Discussion: discussed consults and overall medial plan of care with pt - anticipate d/c in am - pt is aware and agreeable to going home in the morning   Disposition: Home___x_/SNF______/Other_____/Unknown at this time_____    Please call me with any questions at extension [phone]  spent over 36 min on medical management

## 2021-10-27 NOTE — PROGRESS NOTE ADULT - ASSESSMENT
· Assessment	  Impression:  56-year-old male with PMHx HTN p/w diplopia since 2215 the night prior to admission, a/w pressure headache. Stroke code activated. CTA head/neck, no LVO. No evidence of corresponding CTA abnormality. In ED, HTN emergency, 264/135. Currently managed by the neuro critical care team. [facility] team consulted [facility] team to investigate for neurovascular cause of third nerve palsy.     Suggestion:  Patient agreed to undergo diagnostic cerebral angiogram.   Will coordinate when IR suite available.   Management as per NCC.       · Assessment	  Impression:  56-year-old male with PMHx HTN p/w diplopia since 2215 the night prior to admission, a/w pressure headache. Stroke code activated. CTA head/neck, no LVO. No evidence of corresponding CTA abnormality. In ED, HTN emergency, 264/135. Angiography, MRV, MR h/N all negative. Possible occular myasthenia gravis vs supraorbital lozano.    Suggestion:  Recommended Acetylcholine receptor antibodies test for possible myasthenia gravis.  f/u outpatient with neuro ophthalmologist  Pending results of ACHr antibody test, possible MRI Orbit.       · Assessment	  Impression:  56-year-old male with PMHx HTN p/w diplopia since 2215 the night prior to admission, a/w pressure headache. Stroke code activated. CTA head/neck, no LVO. No evidence of corresponding CTA abnormality. In ED, HTN emergency, 264/135. Angiography, MRV, MR h/N all negative. Possible occular myasthenia gravis vs supraorbital lozano.    Suggestion:  Ocular Myasthenia gravis workup including antiacetylcholine receptor, Anti Musk  MRI orbit as outpatient  Neuroophthalmology follow as an outpatient

## 2021-10-27 NOTE — CONSULT NOTE ADULT - ASSESSMENT
Assessment  1.  h/o right  Third nerve palsy on presentation   CT brain stroke protocol - no acute events  - MRI/MRV - no acute pathology   - angiogram done (10/26) -> moderate to severe atherosclerosis, no aneurysms        2. Hypertensive Retinopathy grade 2 OU  -h/o hypertensive emergency -   mmHg  No evidence of malignant hypertensive retinopathy OU    3. Lattice Degeneration OU (RD education)    Plan  - Given that the patients presentation (ptosis/dipoplia) is improving and  given the w/u would recommend patient follow up with neuro-ophthalmology as an outpatient.    pt has been given the names and contact information of a number of neuroophthalmologists including Dr. Seth Mitchell MD  of [facility]. Assessment  1.  h/o right  Third nerve palsy on presentation   CT brain stroke protocol - no acute events  - MRI/MRV - no acute pathology   - angiogram done (10/26) -> moderate to severe atherosclerosis, no aneurysms        2. Hypertensive Retinopathy grade 2 OU  -h/o hypertensive emergency -   mmHg  No evidence of malignant hypertensive retinopathy OU    3. Lattice Degeneration OU (RD education)    Plan  - Given that the patients presentation (ptosis/dipoplia) is improving and  given the w/u would recommend patient follow up with neuro-ophthalmology as an outpatient.    pt has been given the names and contact information of a number of neuroophthalmologists including Dr. Seth Mitchell MD  of [facility].    Case d/w Dr. Serafin Leon MD

## 2021-10-27 NOTE — PROGRESS NOTE ADULT - SUBJECTIVE AND OBJECTIVE BOX
TERESE BECKER 56y Male  MRN#: [id number]   CODE STATUS:________    Hospital Day: 5d    Pt is currently admitted with the primary diagnosis of diplopia    SUBJECTIVE    No Overnight events     patient complains of right eye pain described as pressure-like.    Present Today:   - Jyoti:  No                                            ----------------------------------------------------------  OBJECTIVE  PAST MEDICAL & SURGICAL HISTORY  Hypertension                                              -----------------------------------------------------------  ALLERGIES:  No Known Allergies                                            ------------------------------------------------------------    HOME MEDICATIONS  Home Medications:  aspirin 81 mg oral tablet, chewable: 1 tab(s) orally once a day (23 Oct 2021 02:36)  beet root: 2 tab(s) orally once a day (23 Oct 2021 02:36)                           MEDICATIONS:  STANDING MEDICATIONS  chlorhexidine 4% Liquid 1 Application(s) Topical <User Schedule>  enoxaparin Injectable 40 milliGRAM(s) SubCutaneous daily  hydrALAZINE 50 milliGRAM(s) Oral every 8 hours  influenza   Vaccine 0.5 milliLiter(s) IntraMuscular once  NIFEdipine XL 90 milliGRAM(s) Oral daily    PRN MEDICATIONS  acetaminophen     Tablet .. 650 milliGRAM(s) Oral every 6 hours PRN  hydrALAZINE Injectable 10 milliGRAM(s) IV Push every 2 hours PRN                                            ------------------------------------------------------------  VITAL SIGNS: Last 24 Hours  T(C): 36.6 (27 Oct 2021 07:30), Max: 36.9 (26 Oct 2021 23:55)  T(F): 97.9 (27 Oct 2021 07:30), Max: 98.4 (26 Oct 2021 23:55)  HR: 75 (27 Oct 2021 07:30) (65 - 93)  BP: 162/87 (27 Oct 2021 07:30) (138/78 - 172/100)  BP(mean): 118 (27 Oct 2021 07:30) (113 - 130)  RR: 18 (27 Oct 2021 07:30) (18 - 18)  SpO2: 97% (27 Oct 2021 07:30) (97% - 100%)                                             --------------------------------------------------------------  LABS:                        13.9   5.81  )-----------( 175      ( 27 Oct 2021 08:08 )             43.2     10-27    140  |  108  |  24<H>  ----------------------------<  122<H>  3.9   |  19  |  1.3    Ca    9.2      27 Oct 2021 08:08  Phos  2.7     10-26  Mg     2.2     10-27      PT/INR - ( 26 Oct 2021 08:46 )   PT: 12.20 sec;   INR: 1.06 ratio         PTT - ( 26 Oct 2021 08:46 )  PTT:29.6 sec                                                          -------------------------------------------------------------  RADIOLOGY:    < from: [facility] Duplex Kidneys Limited (10.25.21 @ 15:30) >  Impression:    No evidence of significant hemodynamic stenosis noted in bilateral renal arteries.  Multiple right homogeneous kidney cysts with the largest measuring 3.49 x 3.14 cm.    ICD-10:  I10    --- End of Report ---            NATE ERNANDEZ MD; Resident Radiologist  This document has been electronically signed.  TIFFANIE AKBAR MD; Attending Vascular Surgeon  This document has been electronically signed. Oct 26 2021  3:15PM    < end of copied text >  Patient is s/p DSA today to r/o intracranial aneurysm in the setting of 3d cranial nerve palsy.  Patient tolerated the procedure well, no complications.  Patient's neuro status is unchanged.    There were no intracranial aneurysms identified in Pcomm region. However moderate to severe atherosclerosis seen in the Vesels.                                            --------------------------------------------------------------    PHYSICAL EXAM:    General: NAD  LUNGS: clear bilateral air entry  HEART: reg S1S2  ABDOMEN: soft non tender non distended  EXT: no lower limb edema bilaterally  NEURO: right eyelid ptosis.

## 2021-10-27 NOTE — PROGRESS NOTE ADULT - SUBJECTIVE AND OBJECTIVE BOX
TERESE BECKER  56y  Male      Patient is a 56y old male who presents with a chief complaint of double vision (27 Oct 2021 14:14)      INTERVAL HPI/OVERNIGHT EVENTS:  Patient seen and examined earlier this morning.  Sitting in bed  complaining of being in the hospital for too long and wants to go home        REVIEW OF SYSTEMS:  CONSTITUTIONAL: No fever, weight loss, or fatigue  EYES: right eye pressure/pain, drooping eyelid  ENMT:  No difficulty hearing, tinnitus, vertigo; No sinus or throat pain  NECK: No pain or stiffness  RESPIRATORY: No cough, wheezing, chills or hemoptysis; No shortness of breath  CARDIOVASCULAR: No chest pain, palpitations, dizziness, or leg swelling  GASTROINTESTINAL: No abdominal or epigastric pain. No nausea, vomiting, or hematemesis; No diarrhea or constipation. No melena or hematochezia.  GENITOURINARY: No dysuria, frequency, hematuria, or incontinence  NEUROLOGICAL: No headaches, memory loss, loss of strength, numbness, or tremors  SKIN: No itching, burning, rashes, or lesions   LYMPH NODES: No enlarged glands  ENDOCRINE: No heat or cold intolerance; No hair loss  MUSCULOSKELETAL: No joint pain or swelling; No muscle, back, or extremity pain  PSYCHIATRIC: No depression, anxiety, mood swings, or difficulty sleeping  HEME/LYMPH: No easy bruising, or bleeding gums  ALLERY AND IMMUNOLOGIC: No hives or eczema    T(C): 36.6 (10-27-21 @ 07:30), Max: 36.9 (10-26-21 @ 23:55)  HR: 75 (10-27-21 @ 07:30) (65 - 93)  BP: 162/87 (10-27-21 @ 07:30) (138/78 - 172/100)  RR: 18 (10-27-21 @ 07:30) (18 - 18)  SpO2: 97% (10-27-21 @ 07:30) (97% - 100%)    PHYSICAL EXAM:  GENERAL: NAD, well-groomed, well-developed  HEAD:  Atraumatic, Normocephalic  EYES: right eye ptosis  ENMT: No tonsillar erythema, exudates, or enlargement; Moist mucous membranes, Good dentition, No lesions  NECK: Supple, No JVD, Normal thyroid  NERVOUS SYSTEM:  Alert & Oriented X3, Good concentration; Motor Strength 5/5 B/L upper and lower extremities; DTRs 2+ intact and symmetric  CHEST/LUNG: Clear to percussion bilaterally; No rales, rhonchi, wheezing, or rubs  HEART: Regular rate and rhythm; No murmurs, rubs, or gallops  ABDOMEN: Soft, Nontender, Nondistended; Bowel sounds present  EXTREMITIES:  2+ Peripheral Pulses, No clubbing, cyanosis, or edema  LYMPH: No lymphadenopathy noted  SKIN: No rashes or lesions    Consultant(s) Notes Reviewed:  [x ] YES  [ ] NO  Care Discussed with Consultants/Other Providers [ x] YES  [ ] NO    LAB:                        13.9   5.81  )-----------( 175      ( 27 Oct 2021 08:08 )             43.2     10-27    140  |  108  |  24<H>  ----------------------------<  122<H>  3.9   |  19  |  1.3    Ca    9.2      27 Oct 2021 08:08  Phos  2.7     10-26  Mg     2.2     10-27        Drug Dosing Weight  Height (cm): 175.3 (26 Oct 2021 07:53)  Weight (kg): 89.1 (26 Oct 2021 07:53)  BMI (kg/m2): 29 (26 Oct 2021 07:53)  BSA (m2): 2.05 (26 Oct 2021 07:53)      RADIOLOGY & ADDITIONAL TESTS:  Imaging Personally Reviewed:  [x] YES  [ ] NO    HEALTH ISSUES - PROBLEM Dx:  < from: [facility] Kidneys [facility] (10.25.21 @ 15:30) >  Impression:  No evidence of significant hemodynamic stenosis noted in bilateral renal arteries.  Multiple right homogeneous kidney cysts with the largest measuring 3.49 x 3.14 cm.  < end of copied text >      < from: MR Venogram Head w/wo IV Cont (10.23.21 @ 19:15) >  IMPRESSION:  No acute intracranial abnormalities. Extensive areas of abnormal T2 prolongation in the periventricular white matter regions described above.  Unremarkable MRV of the brain.  < end of copied text >        MEDS:  acetaminophen     Tablet .. 650 milliGRAM(s) Oral every 6 hours PRN  chlorhexidine 4% Liquid 1 Application(s) Topical <User Schedule>  enoxaparin Injectable 40 milliGRAM(s) SubCutaneous daily  hydrALAZINE 50 milliGRAM(s) Oral every 8 hours  hydrALAZINE Injectable 10 milliGRAM(s) IV Push every 2 hours PRN  influenza   Vaccine 0.5 milliLiter(s) IntraMuscular once  NIFEdipine XL 90 milliGRAM(s) Oral daily

## 2021-10-27 NOTE — PROGRESS NOTE ADULT - ATTENDING COMMENTS
plan of care d/w team as above
hypertensive emergency -   on admission  with R eye diplopia/CN III palsy and persistent pressure-like discomfort  was initially in NCCU on Clevipine gtt, converted to PO meds  s/p MRI/MRV without acute pathology,  nonrestricting T2/FLAIR hyperintensities within the periventricular and subcortical white matter region. This could be related to underlying demyelinating disease though underlying infection inflammation or ischemia cannot be entirely excluded. Clinical correlation is recommended  Clinically without evidence of infection  s/p cerebral agiogram today which is reportedly normal  recall neurology given above findings, ?? LP  f/u 2d echop   ophtho eval pending   No evidence of GERMAN on imaging, BP controlled since downgrade to SDU  continue with monitoring in SDU and Q4H neurochecks     #Progress Note Handoff  Pending (specify):  stability of BP, ophtho eval, neuro f/u  Disposition:  from home     Joana Burleson MD  s. [phone]
Patient seen and examined and agree with above except as noted.  Patients history, notes, labs, imaging, vitals and meds reviewed personally.  Discussed with patient multiple possibilities  Exam improved and no fatigability on exam with right partial ptosis and improved medial duction of right eye  NO other focal findings    DDX: neurmuscular dysfunction, superior orbital fissure syndrome, ischemic neuropathy    Plana as above

## 2021-10-27 NOTE — CONSULT NOTE ADULT - SUBJECTIVE AND OBJECTIVE BOX
TERESE BECKER  MRN-[id number]  56y Male        Patient is a 56y old  Male who presents with a chief complaint of double vision (27 Oct 2021 15:03)    HEALTH ISSUES - R/O PROBLEM Dx:    HPI:  56-year-old male with PMHx HTN p/w diplopia since 2215 the night prior to admission, a/w pressure headache. Stroke code activated. CTH revealed no acute pathology, with multiple scattered patchy low attenuations in the bilateral periventricular cerebral white matter without mass effect, nonspecific, could reflect chronic microvascular ischemic changes, demyelinating disease, or leukoencephalopathy. CTA head/neck, no LVO, with mild atherosclerotic stenosis in the origin (less than 50%) and in the distal cavernous segment of the right ICA. CTP revealed calculated hypoperfusion in the right temporal lobe (4 mL) without perfusion evidence of core infarct. Decreased CBV, CBF, and increased Tmax in this region is felt to be artifactual. No evidence of corresponding CTA abnormality. In ED, HTN emergency, 264/135, started on Cleviprex for BP control. Admit to NCCU for optimization.  (23 Oct 2021 02:07)    PAST MEDICAL & SURGICAL HISTORY:  Hypertension      MEDICATIONS  (STANDING):  chlorhexidine 4% Liquid 1 Application(s) Topical <User Schedule>  enoxaparin Injectable 40 milliGRAM(s) SubCutaneous daily  hydrALAZINE 50 milliGRAM(s) Oral every 8 hours  influenza   Vaccine 0.5 milliLiter(s) IntraMuscular once  NIFEdipine XL 90 milliGRAM(s) Oral daily    MEDICATIONS  (PRN):  acetaminophen     Tablet .. 650 milliGRAM(s) Oral every 6 hours PRN Temp greater or equal to 38C (100.4F), Mild Pain (1 - 3)  hydrALAZINE Injectable 10 milliGRAM(s) IV Push every 2 hours PRN SBP > 160    Allergies    No Known Allergies    Intolerances      HEALTH ISSUES - PROBLEM Dx:          KRISTEN: 2 yrs  POHx: h/o eposide of waking up ith dark vision with spotsl, right eye ~ 10 yrs ago. On awakening the next day, sxs resolved. Pt does endorse having a susequent eye exam afterwards but unclear if had a medical w/u at that time  FOHx: Glaucoma    Extended HPI: Sudden onset evening of 10/21/21 at 10:15pm of right lod droop and dipoplia. On awakining the next day he noted no change in sxs and appearance and pesented to the ED. On presentation was noted to have a right CNIII palsy and has had w/i including CT, CTA, MRI, MRA, MRA and a cerebral angiogram     Ocular Medications: none        EXTERNAL:    VISUAL ACUITY:       RIGHT EYE: sc/cc                            LEFT EYE: sc/cc     MANIFEST:    RIGHT EYE:                LEFT EYE:    NEURO TESTING  EXTRAOCULAR MOVEMENTS:  PUPILS:  VFc:    ANTERIOR SEGMENT EVALUATION: :    LIDS/ADENEXA:  CONJUNCTIVA/SCLERA:  CORNEA:  ANTERIOR CHAMBER:   IRIS/PUPIL:  LENS/VITREOUS:    INTRAOCULAR PRESSURE:   OD: mmHg  OS: mmHg  @    POSTERIOR SEGMENT EVALUATION  DFE: 1% Tropicamide, 2.5 % Phenylephrine OU    OPTIC NERVE:  MACULA:  A/V:   FUNDUS/PERIPHERY:    SUPPLEMENTAL TESTING:             TERESE BECKER  MRN-[id number]  56y Male        Patient is a 56y old  Male who presents with a chief complaint of double vision (27 Oct 2021 15:03)    HEALTH ISSUES - R/O PROBLEM Dx:    HPI:  56-year-old male with PMHx HTN p/w diplopia since 2215 the night prior to admission, a/w pressure headache. Stroke code activated. CTH revealed no acute pathology, with multiple scattered patchy low attenuations in the bilateral periventricular cerebral white matter without mass effect, nonspecific, could reflect chronic microvascular ischemic changes, demyelinating disease, or leukoencephalopathy. CTA head/neck, no LVO, with mild atherosclerotic stenosis in the origin (less than 50%) and in the distal cavernous segment of the right ICA. CTP revealed calculated hypoperfusion in the right temporal lobe (4 mL) without perfusion evidence of core infarct. Decreased CBV, CBF, and increased Tmax in this region is felt to be artifactual. No evidence of corresponding CTA abnormality. In ED, HTN emergency, 264/135, started on Cleviprex for BP control. Admit to NCCU for optimization.  (23 Oct 2021 02:07)    PAST MEDICAL & SURGICAL HISTORY:  Hypertension      MEDICATIONS  (STANDING):  chlorhexidine 4% Liquid 1 Application(s) Topical <User Schedule>  enoxaparin Injectable 40 milliGRAM(s) SubCutaneous daily  hydrALAZINE 50 milliGRAM(s) Oral every 8 hours  influenza   Vaccine 0.5 milliLiter(s) IntraMuscular once  NIFEdipine XL 90 milliGRAM(s) Oral daily    MEDICATIONS  (PRN):  acetaminophen     Tablet .. 650 milliGRAM(s) Oral every 6 hours PRN Temp greater or equal to 38C (100.4F), Mild Pain (1 - 3)  hydrALAZINE Injectable 10 milliGRAM(s) IV Push every 2 hours PRN SBP > 160    Allergies    No Known Allergies    Intolerances      HEALTH ISSUES - PROBLEM Dx:          KRISTEN: 2 yrs  POHx: h/o eposide of waking up ith dark vision with spots, right eye ~ 10 yrs ago. On awakening the next day, sxs resolved. Pt does endorse having a susequent eye exam afterwards but unclear if had a medical w/u at that time  FOHx: Glaucoma    Extended HPI: Sudden onset evening of 10/21/21 at 10:15pm of right lid droop and dipoplia. On awakening the next day he noted no change in sxs and appearance and presented to the ED. On presentation was noted to have a right CNIII palsy and a HTN emergency 264/135.  Pt  has had w/u including CT, CTA, MRI, MRA,   and a cerebral angiogram. Pt does admit to a dx of HTN ~ 16 yrs but the last year has been taking  an  OTC supplement . Pt does endorse increased tearing OD since 10/23/21 and some intermittent pain OD when looks to the right.( Pain was relieved with topical Proparacaine in office.)    Pt states lid droop has improved (initial was 75% as compared to the left eye) and that diplopia has improved. Now intermittent and seems to vary with eye position and appears oblique    Ocular Medications: none        EXTERNAL:    VISUAL ACUITY:       RIGHT EYE: sc20/20-3                           LEFT EYE: sc: 20/25 - PH - 20/20     Autorefraction   OD; +1.25-1.00x78  OS  +0.75-0.50x90    NEURO TESTING  EXTRAOCULAR MOVEMENTS: Cover test: small esophoria with trace right hyperphoria. No tropia appreciated no diplopia illicited on EOM's  Red Patino Prabhu testing; RMR over left eye: shows and increased exodeviation to left gaze (which could be consistent with a relative adduction deficit to the right eye).  PUPILS: Pupils are reactive with no APD OU; slight anisocoria noted OD (4mm) OS (3mm) that is similar in light and dark  VFc: FTFC OU    CNV1, CNV2, CNVII, CNVIII - intact     no proptosis/resistance  to retropulsion OU    ANTERIOR SEGMENT EVALUATION: :    LIDS/ADENEXA: clear OU  CONJUNCTIVA/SCLERA: clear OU  CORNEA: clear OU  ANTERIOR CHAMBER: deep/quiet OU  IRIS/PUPIL: flat/brown OU  LENS/VITREOUS: mild NS OU    INTRAOCULAR PRESSURE:   OD: 16 mmHg  OS: 16 mmHg  @ 12:00pm    POSTERIOR SEGMENT EVALUATION  DFE: 0.5% Tropicamide, 2.5 % Phenylephrine OU    OPTIC NERVE: 0.4/0.4 ( no pallor/swelling OU)  MACULA: flat OU  A/V: a/v nicking OU  FUNDUS/PERIPHERY: lattice degeneration superior OU; small retinal hole IT OD             TERESE BECKER  MRN-[id number]  56y Male        Patient is a 56y old  Male who presents with a chief complaint of double vision (27 Oct 2021 15:03)    HEALTH ISSUES - R/O PROBLEM Dx:    HPI:  56-year-old male with PMHx HTN p/w diplopia since 2215 the night prior to admission, a/w pressure headache. Stroke code activated. CTH revealed no acute pathology, with multiple scattered patchy low attenuations in the bilateral periventricular cerebral white matter without mass effect, nonspecific, could reflect chronic microvascular ischemic changes, demyelinating disease, or leukoencephalopathy. CTA head/neck, no LVO, with mild atherosclerotic stenosis in the origin (less than 50%) and in the distal cavernous segment of the right ICA. CTP revealed calculated hypoperfusion in the right temporal lobe (4 mL) without perfusion evidence of core infarct. Decreased CBV, CBF, and increased Tmax in this region is felt to be artifactual. No evidence of corresponding CTA abnormality. In ED, HTN emergency, 264/135, started on Cleviprex for BP control. Admit to NCCU for optimization.  (23 Oct 2021 02:07)    PAST MEDICAL & SURGICAL HISTORY:  Hypertension      MEDICATIONS  (STANDING):  chlorhexidine 4% Liquid 1 Application(s) Topical <User Schedule>  enoxaparin Injectable 40 milliGRAM(s) SubCutaneous daily  hydrALAZINE 50 milliGRAM(s) Oral every 8 hours  influenza   Vaccine 0.5 milliLiter(s) IntraMuscular once  NIFEdipine XL 90 milliGRAM(s) Oral daily    MEDICATIONS  (PRN):  acetaminophen     Tablet .. 650 milliGRAM(s) Oral every 6 hours PRN Temp greater or equal to 38C (100.4F), Mild Pain (1 - 3)  hydrALAZINE Injectable 10 milliGRAM(s) IV Push every 2 hours PRN SBP > 160    Allergies    No Known Allergies    Intolerances      HEALTH ISSUES - PROBLEM Dx:          KRISTEN: 2 yrs  POHx: h/o eposide of waking up ith dark vision with spots, right eye ~ 10 yrs ago. On awakening the next day, sxs resolved. Pt does endorse having a susequent eye exam afterwards but unclear if had a medical w/u at that time  FOHx: Glaucoma    Extended HPI: Sudden onset evening of 10/21/21 at 10:15pm of right lid droop and dipoplia. On awakening the next day he noted no change in sxs and appearance and presented to the ED. On presentation was noted to have a right CNIII palsy and a HTN emergency 264/135.  Pt  has had w/u including CT, CTA, MRI, MRA,   and a cerebral angiogram. Pt does admit to a dx of HTN ~ 16 yrs but the last year has been taking  an  OTC supplement . Pt does endorse increased tearing OD since 10/23/21 and some intermittent pain OD when looks to the right.( Pain was relieved with topical Proparacaine in office.)    Pt states lid droop has improved (initial was 75% as compared to the left eye) and that diplopia has improved. Now intermittent and seems to vary with eye position and appears oblique    Ocular Medications: none        EXTERNAL:    VISUAL ACUITY:       RIGHT EYE: sc20/20-3                           LEFT EYE: sc: 20/25 - PH - 20/20     Autorefraction   OD; +1.25-1.00x78  OS  +0.75-0.50x90    NEURO TESTING  EXTRAOCULAR MOVEMENTS: Cover test: small esophoria with trace right hyperphoria. No tropia appreciated no diplopia illicited on EOM's  Red Patino Prabhu testing; RMR over left eye: shows and increased exodeviation to left gaze (which could be consistent with a relative adduction deficit to the right eye).  PUPILS: Pupils are reactive with no APD OU; slight anisocoria noted OD (4mm) OS (3mm) that is similar in light and dark  VFc: FTFC OU     + right lid ptosis. Aperature opening  OD ~ 50% of that of OS  CNV1, CNV2, CNVII, CNVIII - intact     no proptosis/resistance  to retropulsion OU    ANTERIOR SEGMENT EVALUATION: :    LIDS/ADENEXA: clear OU  CONJUNCTIVA/SCLERA: clear OU  CORNEA: clear OU  ANTERIOR CHAMBER: deep/quiet OU  IRIS/PUPIL: flat/brown OU  LENS/VITREOUS: mild NS OU    INTRAOCULAR PRESSURE:   OD: 16 mmHg  OS: 16 mmHg  @ 12:00pm    POSTERIOR SEGMENT EVALUATION  DFE: 0.5% Tropicamide, 2.5 % Phenylephrine OU    OPTIC NERVE: 0.4/0.4 ( no pallor/swelling OU)  MACULA: flat OU  A/V: a/v nicking OU  FUNDUS/PERIPHERY: lattice degeneration superior OU; small retinal hole IT OD             TERESE BECKER  MRN-[id number]  56y Male        Patient is a 56y old  Male who presents with a chief complaint of double vision (27 Oct 2021 15:03)    HEALTH ISSUES - R/O PROBLEM Dx:    HPI:  56-year-old male with PMHx HTN p/w diplopia since 2215 the night prior to admission, a/w pressure headache. Stroke code activated. CTH revealed no acute pathology, with multiple scattered patchy low attenuations in the bilateral periventricular cerebral white matter without mass effect, nonspecific, could reflect chronic microvascular ischemic changes, demyelinating disease, or leukoencephalopathy. CTA head/neck, no LVO, with mild atherosclerotic stenosis in the origin (less than 50%) and in the distal cavernous segment of the right ICA. CTP revealed calculated hypoperfusion in the right temporal lobe (4 mL) without perfusion evidence of core infarct. Decreased CBV, CBF, and increased Tmax in this region is felt to be artifactual. No evidence of corresponding CTA abnormality. In ED, HTN emergency, 264/135, started on Cleviprex for BP control. Admit to NCCU for optimization.  (23 Oct 2021 02:07)    PAST MEDICAL & SURGICAL HISTORY:  Hypertension      MEDICATIONS  (STANDING):  chlorhexidine 4% Liquid 1 Application(s) Topical <User Schedule>  enoxaparin Injectable 40 milliGRAM(s) SubCutaneous daily  hydrALAZINE 50 milliGRAM(s) Oral every 8 hours  influenza   Vaccine 0.5 milliLiter(s) IntraMuscular once  NIFEdipine XL 90 milliGRAM(s) Oral daily    MEDICATIONS  (PRN):  acetaminophen     Tablet .. 650 milliGRAM(s) Oral every 6 hours PRN Temp greater or equal to 38C (100.4F), Mild Pain (1 - 3)  hydrALAZINE Injectable 10 milliGRAM(s) IV Push every 2 hours PRN SBP > 160    Allergies    No Known Allergies    Intolerances      HEALTH ISSUES - PROBLEM Dx:          KRISTEN: 2 yrs  POHx: h/o eposide of waking up ith dark vision with spots, right eye ~ 10 yrs ago. On awakening the next day, sxs resolved. Pt does endorse having a susequent eye exam afterwards but unclear if had a medical w/u at that time  FOHx: Glaucoma    Extended HPI: Sudden onset evening of 10/21/21 at 10:15pm of right lid droop and dipoplia. On awakening the next day he noted no change in sxs and appearance and presented to the ED. On presentation was noted to have a right CNIII palsy and a HTN emergency 264/135.  Pt  has had w/u including CT, CTA, MRI, MRA,   and a cerebral angiogram. Pt does admit to a dx of HTN ~ 16 yrs but the last year has been taking  an  OTC supplement . Pt does endorse increased tearing OD since 10/23/21 and some intermittent pain OD when looks to the right.( Pain was relieved with topical Proparacaine in office.)    Pt states lid droop has improved (initial was 75% as compared to the left eye) and that diplopia has improved. Now intermittent and seems to vary with eye position and appears oblique    Ocular Medications: none        EXTERNAL:    VISUAL ACUITY:       RIGHT EYE: sc20/20-3                           LEFT EYE: sc: 20/25 - PH - 20/20     Autorefraction   OD; +1.25-1.00x78  OS  +0.75-0.50x90    NEURO TESTING  EXTRAOCULAR MOVEMENTS: Cover test in primary gaze:  small esophoria with trace right hyperphoria. No tropia appreciated no diplopia illicited on EOM's  Red Patino Prabhu testing; RMR over left eye: shows and increased exodeviation to left gaze (which could be consistent with a relative adduction deficit to the right eye).  PUPILS: Pupils are reactive with no APD OU; slight anisocoria noted OD (4mm) OS (3mm) that is similar in light and dark  VFc: FTFC OU     + right lid ptosis. Aperture opening  OD ~ 50% of that of OS  CNV1, CNV2, CNVII, CNVIII - intact     no proptosis/resistance  to retropulsion OU    ANTERIOR SEGMENT EVALUATION: :    LIDS/ADENEXA: clear OU  CONJUNCTIVA/SCLERA: clear OU  CORNEA: clear OU  ANTERIOR CHAMBER: deep/quiet OU  IRIS/PUPIL: flat/brown OU  LENS/VITREOUS: mild NS OU    INTRAOCULAR PRESSURE:   OD: 16 mmHg  OS: 16 mmHg  @ 12:00pm    POSTERIOR SEGMENT EVALUATION  DFE: 0.5% Tropicamide, 2.5 % Phenylephrine OU    OPTIC NERVE: 0.4/0.4 ( no pallor/swelling OU)  MACULA: flat OU  A/V: a/v nicking OU  FUNDUS/PERIPHERY: lattice degeneration superior OU; small retinal hole IT OD

## 2021-10-27 NOTE — PROGRESS NOTE ADULT - SUBJECTIVE AND OBJECTIVE BOX
Neurology Progress Note    Interval History:      HPI:  56-year-old male with PMHx HTN p/w diplopia since 2215 the night prior to admission, a/w pressure headache. Stroke code activated. CTH revealed no acute pathology, with multiple scattered patchy low attenuations in the bilateral periventricular cerebral white matter without mass effect, nonspecific, could reflect chronic microvascular ischemic changes, demyelinating disease, or leukoencephalopathy. CTA head/neck, no LVO, with mild atherosclerotic stenosis in the origin (less than 50%) and in the distal cavernous segment of the right ICA. CTP revealed calculated hypoperfusion in the right temporal lobe (4 mL) without perfusion evidence of core infarct. Decreased CBV, CBF, and increased Tmax in this region is felt to be artifactual. No evidence of corresponding CTA abnormality. In ED, HTN emergency, 264/135, started on Cleviprex for BP control. Admit to NCCU for optimization.  (23 Oct 2021 02:07)      PAST MEDICAL & SURGICAL HISTORY:  Hypertension            Medications:  acetaminophen     Tablet .. 650 milliGRAM(s) Oral every 6 hours PRN  chlorhexidine 4% Liquid 1 Application(s) Topical <User Schedule>  enoxaparin Injectable 40 milliGRAM(s) SubCutaneous daily  hydrALAZINE 50 milliGRAM(s) Oral every 8 hours  hydrALAZINE Injectable 10 milliGRAM(s) IV Push every 2 hours PRN  influenza   Vaccine 0.5 milliLiter(s) IntraMuscular once  NIFEdipine XL 90 milliGRAM(s) Oral daily      Vital Signs Last 24 Hrs  T(C): 36.6 (27 Oct 2021 07:30), Max: 36.9 (26 Oct 2021 23:55)  T(F): 97.9 (27 Oct 2021 07:30), Max: 98.4 (26 Oct 2021 23:55)  HR: 75 (27 Oct 2021 07:30) (65 - 93)  BP: 162/87 (27 Oct 2021 07:30) (138/78 - 172/100)  BP(mean): 118 (27 Oct 2021 07:30) (113 - 130)  RR: 18 (27 Oct 2021 07:30) (18 - 18)  SpO2: 97% (27 Oct 2021 07:30) (97% - 100%)    Neurological Examination:  General:  Appearance is consistent with chronologic age.  No abnormal facies.  Gross skin survey within normal limits.    Cognitive/Language:  Awake, alert, and oriented to person, place, time and date.  Recent and remote memory intact.  Fund of knowledge is appropriate.  Naming, repetition and comprehension intact.   Nondysarthric.    Cranial Nerves  - Eyes: Visual acuity intact, Visual fields full.  EOMI w/o nystagmus, skew or reported double vision.  PERRL.  No ptosis/weakness of eyelid closure.    - Face:  Facial sensation normal V1 - 3, no facial asymmetry.    - Ears/Nose/Throat:  Hearing grossly intact b/l to finger rub.  Palate elevates midline.  Tongue and uvula midline.   Motor examination:  Upper Extremities: L 5/5, R 5/5; Lower extremities: L 5/5, R 5/5.  No observable drift. Normal tone and bulk. No tenderness, twitching, tremors or involuntary movements.  Sensory examination:   Intact to light touch and pinprick, pain, temperature and proprioception and vibration in all extremities.  Reflexes:   2+ b/l biceps, triceps, brachioradialis, patella and achilles.  Plantar response downgoing b/l.  Jaw jerk, Los, clonus absent.  Cerebellum:   FTN/HKS intact.  No dysmetria or dysdiadokinesia.  Gait narrow based and normal.    Labs:  CBC Full  -  ( 27 Oct 2021 08:08 )  WBC Count : 5.81 K/uL  RBC Count : 4.85 M/uL  Hemoglobin : 13.9 g/dL  Hematocrit : 43.2 %  Platelet Count - Automated : 175 K/uL  Mean Cell Volume : 89.1 fL  Mean Cell Hemoglobin : 28.7 pg  Mean Cell Hemoglobin Concentration : 32.2 g/dL  Auto Neutrophil # : 4.70 K/uL  Auto Lymphocyte # : 0.76 K/uL  Auto Monocyte # : 0.29 K/uL  Auto Eosinophil # : 0.02 K/uL  Auto Basophil # : 0.02 K/uL  Auto Neutrophil % : 81.0 %  Auto Lymphocyte % : 13.1 %  Auto Monocyte % : 5.0 %  Auto Eosinophil % : 0.3 %  Auto Basophil % : 0.3 %    10-27    140  |  108  |  24<H>  ----------------------------<  122<H>  3.9   |  19  |  1.3    Ca    9.2      27 Oct 2021 08:08  Phos  2.7     10-26  Mg     2.2     10-27        PT/INR - ( 26 Oct 2021 08:46 )   PT: 12.20 sec;   INR: 1.06 ratio         PTT - ( 26 Oct 2021 08:46 )  PTT:29.6 sec      Neuroimaging:  NCHCT:   < from: MR Head w/wo IV Cont (10.23.21 @ 19:14) >    MRV BRAIN:    Dural Venous Sinuses: Patent    Internal Cerebral Veins and Vein of [place]: Patent    IMPRESSION:    No acute intracranial abnormalities. Extensive areas of abnormal T2 prolongation in the periventricular white matter regions described above.    Unremarkable MRV of the brain.      < end of copied text >  < from: CT Angio Neck w/ IV Cont (10.22.21 @ 14:54) >    IMPRESSION:    CT PERFUSION:  Calculated hypoperfusion in the right temporal lobe (4 mL) without perfusion evidence of core infarct. Decreased CBV, CBF, and increased Tmax in this region is felt to be artifactual. No evidence of corresponding CTA abnormality.      CTA HEAD/NECK:  No evidence of large vessel occlusion, aneurysm, vascular malformation.    Mild atherosclerotic stenosis in the origin (less than 50%) and in the distal cavernous segment of the right ICA.    --- End of Report ---            < end of copied text >    10-25-21 @ 11:17       6 vessel diagnostic cerebral angiogram  10/26/2021  FINDINGS:  -Mild to moderate tortuosity of the main branches originating from the aortic arch.  -The right CCA runs showed an eccentric calcified plaque at the origin of the ICA, and causing less than 25% stenosis, per NASCET criteria.  -The right ICA runs revealed mild to moderate irregularity of distal intracranial ICA caliber, likely due to atherosclerotic changes. The ophthalmic artery seemed prominent and irregular in its caliber. No significant aneurysm in communicating segment noticed. A distinct PCOM was not seen, possibly due to its small caliber.  -Bilateral ECA runs failed to show important vascular abnormalities.  -The left CCA runs displayed a mild irregularity around its bifurcation, likely due to atherosclerotic changes.  - The left ICA runs revealed mild to moderate irregularity of distal intracranial ICA caliber, likely due to atherosclerotic changes. The ophthalmic artery seemed prominent and irregular in its caliber. No significant aneurysm in communicating segment noticed and a distinct PCOM was not seen, possibly due to its small caliber.  -The left SCA runs failed to show the left VA, likely due to originating from the arch.  -The right VA runs showed prominent and patent bilateral PCA, with mild irregularity of their caliber. No significant aneurysm seen.      VENOUS PHASE: No significant venous abnormality noticed.    IMPRESSION:  -Mild to moderate tortuosity of the main branches originating from the aortic arch.  -Less than 25% stenosis of the origin of right ICA, due to calcified plaque.  -Mild irregularity of caliber of bilateral intracranial ICA, likely due to atherosclerotic changes.  -These findings are more common in uncontrolled hypertension and uncontrolled risk factors for developing atherosclerosis.      RECOMMENDATIONS:  -Management per NCC team.  -May consider repeating angiogram in future, if the cause of the third nerve palsy is notdetermined.   Neurology Progress Note    Interval History:  patient seen and examined at bedside. in no acute distress. He reports slight improvement of the ptosis of the right eye. Visual acuity still about the same although describes a slight improvement after dilation from the opthalmologist.    HPI:  56-year-old male with PMHx HTN p/w diplopia since 2215 the night prior to admission, a/w pressure headache. Stroke code activated. CTH revealed no acute pathology, with multiple scattered patchy low attenuations in the bilateral periventricular cerebral white matter without mass effect, nonspecific, could reflect chronic microvascular ischemic changes, demyelinating disease, or leukoencephalopathy. CTA head/neck, no LVO, with mild atherosclerotic stenosis in the origin (less than 50%) and in the distal cavernous segment of the right ICA. CTP revealed calculated hypoperfusion in the right temporal lobe (4 mL) without perfusion evidence of core infarct. Decreased CBV, CBF, and increased Tmax in this region is felt to be artifactual. No evidence of corresponding CTA abnormality. In ED, HTN emergency, 264/135, started on Cleviprex for BP control. Admit to NCCU for optimization.  (23 Oct 2021 02:07)      PAST MEDICAL & SURGICAL HISTORY:  Hypertension            Medications:  acetaminophen     Tablet .. 650 milliGRAM(s) Oral every 6 hours PRN  chlorhexidine 4% Liquid 1 Application(s) Topical <User Schedule>  enoxaparin Injectable 40 milliGRAM(s) SubCutaneous daily  hydrALAZINE 50 milliGRAM(s) Oral every 8 hours  hydrALAZINE Injectable 10 milliGRAM(s) IV Push every 2 hours PRN  influenza   Vaccine 0.5 milliLiter(s) IntraMuscular once  NIFEdipine XL 90 milliGRAM(s) Oral daily      Vital Signs Last 24 Hrs  T(C): 36.6 (27 Oct 2021 07:30), Max: 36.9 (26 Oct 2021 23:55)  T(F): 97.9 (27 Oct 2021 07:30), Max: 98.4 (26 Oct 2021 23:55)  HR: 75 (27 Oct 2021 07:30) (65 - 93)  BP: 162/87 (27 Oct 2021 07:30) (138/78 - 172/100)  BP(mean): 118 (27 Oct 2021 07:30) (113 - 130)  RR: 18 (27 Oct 2021 07:30) (18 - 18)  SpO2: 97% (27 Oct 2021 07:30) (97% - 100%)    Neurological Examination:  General:  Appearance is consistent with chronologic age.  No abnormal facies.  Gross skin survey within normal limits.    Cognitive/Language:  Awake, alert, and oriented to person, place, time and date.  Recent and remote memory intact.  Fund of knowledge is appropriate.  Naming, repetition and comprehension intact.   Nondysarthric.    Cranial Nerves  - Eyes: Visual acuity intact, Visual fields full.  EOMI w/o nystagmus.  Unable to assess pupilary reflex/symmetry due to dilation.  ptosis/weakness of Right eyelid at rest. Can open widely when actively attempting. No eyelid drooping with constatn upward gaze. Double vision reported after conjugate gaze held for >30 seconds both Left and right.  - Face:  Facial sensation normal V1 - 3, no facial asymmetry.    - Ears/Nose/Throat:  Hearing grossly intact b/l to finger rub.  Palate elevates midline.  Tongue and uvula midline.   Motor examination:  Upper Extremities: L 5/5, R 5/5; Lower extremities: L 5/5, R 5/5.  No observable drift. Normal tone and bulk. No tenderness, twitching, tremors or involuntary movements.  Sensory examination:   Intact to light touch and pinprick, pain, temperature and proprioception and vibration in all extremities.  Reflexes:   2+ b/l biceps, triceps, brachioradialis, patella and achilles.  Plantar response downgoing b/l.  Jaw jerk, Los, clonus absent.  Cerebellum:   FTN/HKS intact.  No dysmetria or dysdiadokinesia.  Gait narrow based and normal.    Labs:  CBC Full  -  ( 27 Oct 2021 08:08 )  WBC Count : 5.81 K/uL  RBC Count : 4.85 M/uL  Hemoglobin : 13.9 g/dL  Hematocrit : 43.2 %  Platelet Count - Automated : 175 K/uL  Mean Cell Volume : 89.1 fL  Mean Cell Hemoglobin : 28.7 pg  Mean Cell Hemoglobin Concentration : 32.2 g/dL  Auto Neutrophil # : 4.70 K/uL  Auto Lymphocyte # : 0.76 K/uL  Auto Monocyte # : 0.29 K/uL  Auto Eosinophil # : 0.02 K/uL  Auto Basophil # : 0.02 K/uL  Auto Neutrophil % : 81.0 %  Auto Lymphocyte % : 13.1 %  Auto Monocyte % : 5.0 %  Auto Eosinophil % : 0.3 %  Auto Basophil % : 0.3 %    10-27    140  |  108  |  24<H>  ----------------------------<  122<H>  3.9   |  19  |  1.3    Ca    9.2      27 Oct 2021 08:08  Phos  2.7     10-26  Mg     2.2     10-27        PT/INR - ( 26 Oct 2021 08:46 )   PT: 12.20 sec;   INR: 1.06 ratio         PTT - ( 26 Oct 2021 08:46 )  PTT:29.6 sec      Neuroimaging:  NCHCT:   < from: MR Head w/wo IV Cont (10.23.21 @ 19:14) >    MRV BRAIN:    Dural Venous Sinuses: Patent    Internal Cerebral Veins and Vein of [place]: Patent    IMPRESSION:    No acute intracranial abnormalities. Extensive areas of abnormal T2 prolongation in the periventricular white matter regions described above.    Unremarkable MRV of the brain.      < end of copied text >  < from: CT Angio Neck w/ IV Cont (10.22.21 @ 14:54) >    IMPRESSION:    CT PERFUSION:  Calculated hypoperfusion in the right temporal lobe (4 mL) without perfusion evidence of core infarct. Decreased CBV, CBF, and increased Tmax in this region is felt to be artifactual. No evidence of corresponding CTA abnormality.      CTA HEAD/NECK:  No evidence of large vessel occlusion, aneurysm, vascular malformation.    Mild atherosclerotic stenosis in the origin (less than 50%) and in the distal cavernous segment of the right ICA.    --- End of Report ---            < end of copied text >    10-25-21 @ 11:17       6 vessel diagnostic cerebral angiogram  10/26/2021  FINDINGS:  -Mild to moderate tortuosity of the main branches originating from the aortic arch.  -The right CCA runs showed an eccentric calcified plaque at the origin of the ICA, and causing less than 25% stenosis, per NASCET criteria.  -The right ICA runs revealed mild to moderate irregularity of distal intracranial ICA caliber, likely due to atherosclerotic changes. The ophthalmic artery seemed prominent and irregular in its caliber. No significant aneurysm in communicating segment noticed. A distinct PCOM was not seen, possibly due to its small caliber.  -Bilateral ECA runs failed to show important vascular abnormalities.  -The left CCA runs displayed a mild irregularity around its bifurcation, likely due to atherosclerotic changes.  - The left ICA runs revealed mild to moderate irregularity of distal intracranial ICA caliber, likely due to atherosclerotic changes. The ophthalmic artery seemed prominent and irregular in its caliber. No significant aneurysm in communicating segment noticed and a distinct PCOM was not seen, possibly due to its small caliber.  -The left SCA runs failed to show the left VA, likely due to originating from the arch.  -The right VA runs showed prominent and patent bilateral PCA, with mild irregularity of their caliber. No significant aneurysm seen.      VENOUS PHASE: No significant venous abnormality noticed.    IMPRESSION:  -Mild to moderate tortuosity of the main branches originating from the aortic arch.  -Less than 25% stenosis of the origin of right ICA, due to calcified plaque.  -Mild irregularity of caliber of bilateral intracranial ICA, likely due to atherosclerotic changes.  -These findings are more common in uncontrolled hypertension and uncontrolled risk factors for developing atherosclerosis.      RECOMMENDATIONS:  -Management per NCC team.  -May consider repeating angiogram in future, if the cause of the third nerve palsy is notdetermined.

## 2021-10-28 ENCOUNTER — TRANSCRIPTION ENCOUNTER (OUTPATIENT)
Age: 56
End: 2021-10-28

## 2021-10-28 VITALS
HEART RATE: 72 BPM | OXYGEN SATURATION: 99 % | TEMPERATURE: 97 F | SYSTOLIC BLOOD PRESSURE: 151 MMHG | DIASTOLIC BLOOD PRESSURE: 74 MMHG | RESPIRATION RATE: 18 BRPM

## 2021-10-28 LAB
ANION GAP SERPL CALC-SCNC: 13 MMOL/L — SIGNIFICANT CHANGE UP (ref 7–14)
BASOPHILS # BLD AUTO: 0.02 K/UL — SIGNIFICANT CHANGE UP (ref 0–0.2)
BASOPHILS NFR BLD AUTO: 0.3 % — SIGNIFICANT CHANGE UP (ref 0–1)
BUN SERPL-MCNC: 22 MG/DL — HIGH (ref 10–20)
CALCIUM SERPL-MCNC: 8.9 MG/DL — SIGNIFICANT CHANGE UP (ref 8.5–10.1)
CHLORIDE SERPL-SCNC: 107 MMOL/L — SIGNIFICANT CHANGE UP (ref 98–110)
CO2 SERPL-SCNC: 21 MMOL/L — SIGNIFICANT CHANGE UP (ref 17–32)
CREAT SERPL-MCNC: 1.1 MG/DL — SIGNIFICANT CHANGE UP (ref 0.7–1.5)
EOSINOPHIL # BLD AUTO: 0.07 K/UL — SIGNIFICANT CHANGE UP (ref 0–0.7)
EOSINOPHIL NFR BLD AUTO: 1.2 % — SIGNIFICANT CHANGE UP (ref 0–8)
GLUCOSE SERPL-MCNC: 107 MG/DL — HIGH (ref 70–99)
HCT VFR BLD CALC: 40.9 % — LOW (ref 42–52)
HGB BLD-MCNC: 13.6 G/DL — LOW (ref 14–18)
IMM GRANULOCYTES NFR BLD AUTO: 0.2 % — SIGNIFICANT CHANGE UP (ref 0.1–0.3)
LYMPHOCYTES # BLD AUTO: 1.1 K/UL — LOW (ref 1.2–3.4)
LYMPHOCYTES # BLD AUTO: 18.6 % — LOW (ref 20.5–51.1)
MAGNESIUM SERPL-MCNC: 2.1 MG/DL — SIGNIFICANT CHANGE UP (ref 1.8–2.4)
MCHC RBC-ENTMCNC: 29.4 PG — SIGNIFICANT CHANGE UP (ref 27–31)
MCHC RBC-ENTMCNC: 33.3 G/DL — SIGNIFICANT CHANGE UP (ref 32–37)
MCV RBC AUTO: 88.5 FL — SIGNIFICANT CHANGE UP (ref 80–94)
MONOCYTES # BLD AUTO: 0.42 K/UL — SIGNIFICANT CHANGE UP (ref 0.1–0.6)
MONOCYTES NFR BLD AUTO: 7.1 % — SIGNIFICANT CHANGE UP (ref 1.7–9.3)
NEUTROPHILS # BLD AUTO: 4.29 K/UL — SIGNIFICANT CHANGE UP (ref 1.4–6.5)
NEUTROPHILS NFR BLD AUTO: 72.6 % — SIGNIFICANT CHANGE UP (ref 42.2–75.2)
NRBC # BLD: 0 /100 WBCS — SIGNIFICANT CHANGE UP (ref 0–0)
PLATELET # BLD AUTO: 173 K/UL — SIGNIFICANT CHANGE UP (ref 130–400)
POTASSIUM SERPL-MCNC: 4 MMOL/L — SIGNIFICANT CHANGE UP (ref 3.5–5)
POTASSIUM SERPL-SCNC: 4 MMOL/L — SIGNIFICANT CHANGE UP (ref 3.5–5)
RBC # BLD: 4.62 M/UL — LOW (ref 4.7–6.1)
RBC # FLD: 14.6 % — HIGH (ref 11.5–14.5)
SODIUM SERPL-SCNC: 141 MMOL/L — SIGNIFICANT CHANGE UP (ref 135–146)
WBC # BLD: 5.91 K/UL — SIGNIFICANT CHANGE UP (ref 4.8–10.8)
WBC # FLD AUTO: 5.91 K/UL — SIGNIFICANT CHANGE UP (ref 4.8–10.8)

## 2021-10-28 PROCEDURE — 99239 HOSP IP/OBS DSCHRG MGMT >30: CPT

## 2021-10-28 RX ORDER — HYDRALAZINE HCL 50 MG
1 TABLET ORAL
Qty: 90 | Refills: 2
Start: 2021-10-28 | End: 2022-01-25

## 2021-10-28 RX ORDER — NIFEDIPINE 30 MG
1 TABLET, EXTENDED RELEASE 24 HR ORAL
Qty: 30 | Refills: 2
Start: 2021-10-28 | End: 2022-01-25

## 2021-10-28 RX ADMIN — Medication 90 MILLIGRAM(S): at 05:49

## 2021-10-28 RX ADMIN — Medication 50 MILLIGRAM(S): at 05:49

## 2021-10-28 RX ADMIN — CHLORHEXIDINE GLUCONATE 1 APPLICATION(S): 213 SOLUTION TOPICAL at 11:07

## 2021-10-28 NOTE — DISCHARGE NOTE PROVIDER - CARE PROVIDER_API CALL
Christiano Perkins)  EEGEpilepsy; Neurology  [address] [place] [address], [address]  [place] [address]  Phone: [phone]  Fax: [phone]  Follow Up Time:

## 2021-10-28 NOTE — DISCHARGE NOTE PROVIDER - NSDCFUADDAPPT_GEN_ALL_CORE_FT
you have a scheduled clinic appointment in the [facility] clinic on Monday November 15 08:30 am with Dr Kenny

## 2021-10-28 NOTE — DISCHARGE NOTE PROVIDER - NSDCCPCAREPLAN_GEN_ALL_CORE_FT
PRINCIPAL DISCHARGE DIAGNOSIS  Diagnosis: Diplopia  Assessment and Plan of Treatment: you presented for diplopia -> found to have severe hypertension. neurology consulted-> CT brain stroke protocol was done showed non specific changes. MRI/MRV done and showed no acute changes. neuroendovascular were consulted and performed a cerebral angiogram for the suspicion of an aneurysm that might be compressing one of your cranial nerves (cranial nerve 3)-> no aneurysms were found, only atherosclerosis. ophtalmology consult was done -> you were found to have hypertensive retinopathy that results from chronic hypertension. -> they recomended outpatient follow up wit neuro-ophtalmologist (Dr. Seth Mitchell MD  of [facility]) for MRI orbits as outpatient  (also recommended by neurology)      SECONDARY DISCHARGE DIAGNOSES  Diagnosis: Hypertensive emergency  Assessment and Plan of Treatment: you presented for diplopia and found to have severe hypertension -> you were started on IV antihypertensive (cleviprex drip) and then swtiched to oral antihypertensive . you blood pressure was controlled. you should follow up with your PCP    Diagnosis: Hypertensive retinopathy  Assessment and Plan of Treatment: you presented for diplopia.ophtalmology consult was done -> you were found to have hypertensive retinopathy that results from chronic hypertension. you should follow up with your PCP and monitor your BP daily     PRINCIPAL DISCHARGE DIAGNOSIS  Diagnosis: Diplopia  Assessment and Plan of Treatment: you presented for diplopia -> found to have severe hypertension. neurology consulted-> CT brain stroke protocol was done showed non specific changes. MRI/MRV done and showed no acute changes. neuroendovascular were consulted and performed a cerebral angiogram for the suspicion of an aneurysm that might be compressing one of your cranial nerves (cranial nerve 3)-> no aneurysms were found, only atherosclerosis. ophtalmology consult was done -> you were found to have hypertensive retinopathy that results from chronic hypertension. -> they recomended outpatient follow up wit neuro-ophtalmologist (Dr. Seth Mitchell MD  of [facility] [address] [address] [place] [address] - phone: [phone]) for MRI orbits as outpatient  (also recommended by neurology). neurology also ordered lab tests to rule out a condition called myashtenia gravis, you should follow up with neurology as outpatient      SECONDARY DISCHARGE DIAGNOSES  Diagnosis: Hypertensive emergency  Assessment and Plan of Treatment: you presented for diplopia and found to have severe hypertension -> you were started on IV antihypertensive (cleviprex drip) and then swtiched to oral antihypertensive . you blood pressure was controlled. you should follow up with your PCP    Diagnosis: Hypertensive retinopathy  Assessment and Plan of Treatment: you presented for diplopia.ophtalmology consult was done -> you were found to have hypertensive retinopathy that results from chronic hypertension. you should follow up with your PCP and monitor your BP daily

## 2021-10-28 NOTE — DISCHARGE NOTE NURSING/CASE MANAGEMENT/SOCIAL WORK - PATIENT PORTAL LINK FT
You can access the FollowMyHealth Patient Portal offered by [facility] by registering at the following website: [email or web address]. By joining [facility]’s FollowMyHealth portal, you will also be able to view your health information using other applications (apps) compatible with our system.

## 2021-10-28 NOTE — DISCHARGE NOTE NURSING/CASE MANAGEMENT/SOCIAL WORK - NSDCFUADDAPPT_GEN_ALL_CORE_FT
you have a scheduled clinic appointment in the [facility] clinic on Monday November 15 08:30 am with Dr Kenny

## 2021-10-28 NOTE — DISCHARGE NOTE PROVIDER - HOSPITAL COURSE
55 y/o p/w 1 day Hx of diplopia on 10/22, BP 260s on arrival. 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 Initial CTH/CTA negative. Started on Cleviprex gtt. MRI/MRV completed, no acute changes, chronic white matter changes noted. Transitioned to oral antihypertensives. Evaluated by [facility] for DSA to r/o pcomm aneurysm compressing nerve. Secondary HTN workup pending.    #diplopia  - CT brain stroke protocol -> no acute events  - MRI/MRV completed- no acuity  - angiogram done (10/26) -> moderate to severe atherosclerosis, no aneurysms  - f/u opthalmology -> recommend outpatient neuro-ophtalmology  - f/u neurology -> myasthenia gravis antibodies/ neuro-opht/ MRI orbits outpatient  - f/u TTE -> nl    #hypertensive emergency -   mmHg  - placed initially on cleviprex drip -> oral  - Keep -160; nifedipine, hydralazine PO  - Telemetry monitoring  - 12-lead ECG  - cardiac enzymes 2 negative  - opht -> hypertensive retinopathy   55 y/o p/w 1 day Hx of diplopia on 10/22, BP 260s on arrival. 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 Initial CTH/CTA negative. Started on Cleviprex gtt. MRI/MRV completed, no acute changes, chronic white matter changes noted. Transitioned to oral antihypertensives. Evaluated by [facility] for DSA to r/o pcomm aneurysm compressing nerve. Secondary HTN workup pending.    #diplopia  - CT brain stroke protocol -> no acute events  - MRI/MRV completed- no acuity  - angiogram done (10/26) -> moderate to severe atherosclerosis, no aneurysms  - f/u opthalmology -> recommend outpatient neuro-ophtalmology  - f/u neurology -> myasthenia gravis antibodies/ neuro-opht/ MRI orbits outpatient  - TTE -> normal     #hypertensive emergency -   mmHg  - placed initially on cleviprex drip -> oral  - Keep -160; nifedipine, hydralazine PO  - Telemetry monitoring  - 12-lead ECG  - cardiac enzymes 2 negative  - opht -> hypertensive retinopathy    Patient seen and examined this morning  lying comfortably in bed  states his symptoms have improved     Vital Signs Last 24 Hrs  T(C): 36.2 (28 Oct 2021 08:30), Max: 36.9 (27 Oct 2021 19:53)  T(F): 97.2 (28 Oct 2021 08:30), Max: 98.4 (27 Oct 2021 19:53)  HR: 72 (28 Oct 2021 08:30) (72 - 94)  BP: 151/74 (28 Oct 2021 08:30) (133/69 - 153/75)  BP(mean): 99 (27 Oct 2021 16:27) (99 - 99)  RR: 18 (28 Oct 2021 08:30) (18 - 18)  SpO2: 99% (28 Oct 2021 08:30) (98% - 100%)    PHYSICAL EXAM:  GENERAL: NAD, well-groomed, well-developed  HEAD:  Atraumatic, Normocephalic  EYES: EOMI, PERRLA, conjunctiva and sclera clear  NERVOUS SYSTEM:  Alert & Oriented X 4, Good concentration; Motor Strength 5/5 B/L upper and lower extremities; right eye ptosis  CHEST/LUNG: Clear to percussion bilaterally; No rales, rhonchi, wheezing, or rubs  HEART: Regular rate and rhythm; No murmurs, rubs, or gallops  ABDOMEN: Soft, Nontender, Nondistended; Bowel sounds present  EXTREMITIES:  2+ Peripheral Pulses, No clubbing, cyanosis, or edema  SKIN: No rashes or lesions    D/C today; d/c planning took over 45 min  d/c papers done by me  discussed d/c plan and all instructions in detail 57 y/o p/w 1 day Hx of diplopia on 10/22, BP 260s on arrival. 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 Initial CTH/CTA negative. Started on Cleviprex gtt. MRI/MRV completed, no acute changes, chronic white matter changes noted. Transitioned to oral antihypertensives. Evaluated by [facility] for DSA to r/o pcomm aneurysm compressing nerve. Secondary HTN workup pending.    #diplopia  - CT brain stroke protocol -> no acute events  - MRI/MRV completed- no acuity  - angiogram done (10/26) -> moderate to severe atherosclerosis, no aneurysms  - f/u opthalmology -> recommend outpatient neuro-ophtalmology : (Dr. Seth Mitchell MD  of [facility] [address]  [address] [place] [address] - phone: [phone]  - f/u neurology -> myasthenia gravis antibodies/ neuro-opht/ MRI orbits outpatient  - TTE -> normal     #hypertensive emergency -   mmHg  - placed initially on cleviprex drip -> oral  - Keep -160; nifedipine, hydralazine PO  - Telemetry monitoring  - 12-lead ECG  - cardiac enzymes 2 negative  - opht -> hypertensive retinopathy    Patient seen and examined this morning  lying comfortably in bed  states his symptoms have improved     Vital Signs Last 24 Hrs  T(C): 36.2 (28 Oct 2021 08:30), Max: 36.9 (27 Oct 2021 19:53)  T(F): 97.2 (28 Oct 2021 08:30), Max: 98.4 (27 Oct 2021 19:53)  HR: 72 (28 Oct 2021 08:30) (72 - 94)  BP: 151/74 (28 Oct 2021 08:30) (133/69 - 153/75)  BP(mean): 99 (27 Oct 2021 16:27) (99 - 99)  RR: 18 (28 Oct 2021 08:30) (18 - 18)  SpO2: 99% (28 Oct 2021 08:30) (98% - 100%)    PHYSICAL EXAM:  GENERAL: NAD, well-groomed, well-developed  HEAD:  Atraumatic, Normocephalic  EYES: EOMI, PERRLA, conjunctiva and sclera clear  NERVOUS SYSTEM:  Alert & Oriented X 4, Good concentration; Motor Strength 5/5 B/L upper and lower extremities; right eye ptosis  CHEST/LUNG: Clear to percussion bilaterally; No rales, rhonchi, wheezing, or rubs  HEART: Regular rate and rhythm; No murmurs, rubs, or gallops  ABDOMEN: Soft, Nontender, Nondistended; Bowel sounds present  EXTREMITIES:  2+ Peripheral Pulses, No clubbing, cyanosis, or edema  SKIN: No rashes or lesions    D/C today; d/c planning took over 45 min  d/c papers done by me  discussed d/c plan and all instructions in detail

## 2021-10-29 PROBLEM — I10 ESSENTIAL (PRIMARY) HYPERTENSION: Chronic | Status: ACTIVE | Noted: 2021-10-23

## 2021-10-31 LAB
METANEPHRINE, PL: 46.7 PG/ML — SIGNIFICANT CHANGE UP (ref 0–88)
NORMETANEPHRINE, PL: 167 PG/ML — HIGH (ref 0–136.8)
RENIN PLAS-CCNC: 0.72 NG/ML/HR — SIGNIFICANT CHANGE UP (ref 0.17–5.38)

## 2021-11-01 LAB
ACRM BINDING ANTIBODY: <0.03 NMOL/L — SIGNIFICANT CHANGE UP (ref 0–0.24)
DOPAMINE - URINE 24 HOUR: 171 UG/24 HR — SIGNIFICANT CHANGE UP (ref 0–510)
DOPAMINE UR-MCNC: 263 UG/L — SIGNIFICANT CHANGE UP
EPINEPH UR-MCNC: 21 UG/L — SIGNIFICANT CHANGE UP
EPINEPHRINE - URINE, 24 HOUR: 14 UG/24 HR — SIGNIFICANT CHANGE UP (ref 0–20)
NOREPINEPH UR-MCNC: 104 UG/L — SIGNIFICANT CHANGE UP
NOREPINEPHRINE - URINE, 24 HOUR: 68 UG/24 HR — SIGNIFICANT CHANGE UP (ref 0–135)

## 2021-11-02 DIAGNOSIS — Z79.82 LONG TERM (CURRENT) USE OF ASPIRIN: ICD-10-CM

## 2021-11-02 DIAGNOSIS — H02.401 UNSPECIFIED PTOSIS OF RIGHT EYELID: ICD-10-CM

## 2021-11-02 DIAGNOSIS — I67.2 CEREBRAL ATHEROSCLEROSIS: ICD-10-CM

## 2021-11-02 DIAGNOSIS — I16.1 HYPERTENSIVE EMERGENCY: ICD-10-CM

## 2021-11-02 DIAGNOSIS — H35.039 HYPERTENSIVE RETINOPATHY, UNSPECIFIED EYE: ICD-10-CM

## 2021-11-02 DIAGNOSIS — H53.2 DIPLOPIA: ICD-10-CM

## 2021-11-02 DIAGNOSIS — H49.03 THIRD [OCULOMOTOR] NERVE PALSY, BILATERAL: ICD-10-CM

## 2021-11-05 PROBLEM — Z00.00 ENCOUNTER FOR PREVENTIVE HEALTH EXAMINATION: Status: ACTIVE | Noted: 2021-11-05

## 2021-11-08 LAB — MUSK IGG SER IA-MCNC: <1 U/ML — SIGNIFICANT CHANGE UP

## 2021-11-15 ENCOUNTER — APPOINTMENT (OUTPATIENT)
Dept: INTERNAL MEDICINE | Facility: CLINIC | Age: 56
End: 2021-11-15

## 2022-09-23 NOTE — ED ADULT NURSE NOTE - BEFAST SPEECH SLURRED
09/22/22 2301   Treatment Team Notification   Reason for Communication Evaluate; Review case  (No UO from ileal conduit)   Team Member Name Dr. Andriy Cristina Team Role Resident   Method of Communication Secure Message   Response No new orders     Resident stated they will reassess in the AM and again when they take him back for a second look. No

## 2024-02-08 NOTE — STROKE CODE NOTE - CODE STROKE CANCEL
Problem: Pain  Goal: My pain/discomfort is manageable  Outcome: Progressing     Problem: Safety  Goal: Patient will be injury free during hospitalization  Outcome: Progressing  Goal: I will remain free of falls  Outcome: Progressing     Problem: Daily Care  Goal: Daily care needs are met  Outcome: Progressing     Problem: Psychosocial Needs  Goal: Demonstrates ability to cope with hospitalization/illness  Outcome: Progressing  Goal: Collaborate with me, my family, and caregiver to identify my specific goals  Outcome: Progressing     Problem: Discharge Barriers  Goal: My discharge needs are met  Outcome: Progressing     Problem: Skin  Goal: Decreased wound size/increased tissue granulation at next dressing change  Outcome: Progressing  Flowsheets (Taken 2/8/2024 0244)  Decreased wound size/increased tissue granulation at next dressing change:   Promote sleep for wound healing   Utilize specialty bed per algorithm   Protective dressings over bony prominences  Goal: Participates in plan/prevention/treatment measures  Outcome: Progressing  Flowsheets (Taken 2/8/2024 0244)  Participates in plan/prevention/treatment measures:   Discuss with provider PT/OT consult   Increase activity/out of bed for meals   Elevate heels  Goal: Prevent/manage excess moisture  Outcome: Progressing  Flowsheets (Taken 2/8/2024 0244)  Prevent/manage excess moisture:   Cleanse incontinence/protect with barrier cream   Moisturize dry skin   Use wicking fabric (obtain order)   Follow provider orders for dressing changes   Monitor for/manage infection if present  Goal: Prevent/minimize sheer/friction injuries  Outcome: Progressing  Flowsheets (Taken 2/8/2024 0244)  Prevent/minimize sheer/friction injuries:   Complete micro-shifts as needed if patient unable. Adjust patient position to relieve pressure points, not a full turn   HOB 30 degrees or less   Increase activity/out of bed for meals   Turn/reposition every 2 hours/use positioning/transfer  devices   Use pull sheet   Utilize specialty bed per algorithm  Goal: Promote/optimize nutrition  Outcome: Progressing  Flowsheets (Taken 2/8/2024 0244)  Promote/optimize nutrition:   Assist with feeding   Discuss with provider if NPO > 2 days   Offer water/supplements/favorite foods   Consume > 50% meals/supplements   Monitor/record intake including meals   Reassess MST if dietician not consulted  Goal: Promote skin healing  Outcome: Progressing  Flowsheets (Taken 2/8/2024 0244)  Promote skin healing:   Assess skin/pad under line(s)/device(s)   Ensure correct size (line/device) and apply per  instructions   Protective dressings over bony prominences   Rotate device position/do not position patient on device   Turn/reposition every 2 hours/use positioning/transfer devices     Problem: Fall/Injury  Goal: Not fall by end of shift  Outcome: Progressing  Goal: Be free from injury by end of the shift  Outcome: Progressing  Goal: Verbalize understanding of personal risk factors for fall in the hospital  Outcome: Progressing  Goal: Verbalize understanding of risk factor reduction measures to prevent injury from fall in the home  Outcome: Progressing  Goal: Use assistive devices by end of the shift  Outcome: Progressing  Goal: Pace activities to prevent fatigue by end of the shift  Outcome: Progressing       The patient's goals for the shift include Be able to get out of bed    The clinical goals for the shift include hep gtt will be theraputic       No